# Patient Record
Sex: FEMALE | Race: WHITE | NOT HISPANIC OR LATINO | Employment: UNEMPLOYED | URBAN - METROPOLITAN AREA
[De-identification: names, ages, dates, MRNs, and addresses within clinical notes are randomized per-mention and may not be internally consistent; named-entity substitution may affect disease eponyms.]

---
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
## 2022-02-09 ENCOUNTER — TELEPHONE (OUTPATIENT)
Dept: FAMILY MEDICINE CLINIC | Facility: CLINIC | Age: 22
End: 2022-02-09

## 2022-02-09 NOTE — TELEPHONE ENCOUNTER
Caller: LIVIER CHEN    Relationship: Emergency Contact    Best call back number: [phone]    What orders are you requesting (i.e. lab or imaging): LABS     In what timeframe would the patient need to come in: ASAP    Where will you receive your lab/imaging services: IN-OFFICE    Additional notes: THE PATIENT'S GRANDMOTHER WOULD LIKE ORDERS TO CHECK FOR ANEMIA FOR THE PATIENT AND A CALL BACK WHEN THE ORDERS ARE READY

## 2022-02-10 ENCOUNTER — OFFICE VISIT (OUTPATIENT)
Dept: FAMILY MEDICINE CLINIC | Facility: CLINIC | Age: 22
End: 2022-02-10

## 2022-02-10 DIAGNOSIS — Z53.21 PATIENT LEFT WITHOUT BEING SEEN: Primary | ICD-10-CM

## 2022-02-10 NOTE — PROGRESS NOTES
The patient left the office before care was provided and did not complete the visit because of personal reason and wait time.

## 2022-03-09 ENCOUNTER — OFFICE VISIT (OUTPATIENT)
Dept: FAMILY MEDICINE CLINIC | Facility: CLINIC | Age: 22
End: 2022-03-09

## 2022-03-09 VITALS
OXYGEN SATURATION: 99 % | BODY MASS INDEX: 26.13 KG/M2 | DIASTOLIC BLOOD PRESSURE: 70 MMHG | HEART RATE: 84 BPM | TEMPERATURE: 99.6 F | HEIGHT: 62 IN | RESPIRATION RATE: 18 BRPM | SYSTOLIC BLOOD PRESSURE: 129 MMHG | WEIGHT: 142 LBS

## 2022-03-09 DIAGNOSIS — G43.829 MENSTRUAL MIGRAINE WITHOUT STATUS MIGRAINOSUS, NOT INTRACTABLE: ICD-10-CM

## 2022-03-09 DIAGNOSIS — J30.1 SEASONAL ALLERGIC RHINITIS DUE TO POLLEN: Primary | ICD-10-CM

## 2022-03-09 DIAGNOSIS — J06.9 VIRAL UPPER RESPIRATORY TRACT INFECTION: ICD-10-CM

## 2022-03-09 PROCEDURE — 99213 OFFICE O/P EST LOW 20 MIN: CPT | Performed by: FAMILY MEDICINE

## 2022-03-09 RX ORDER — MECLIZINE HYDROCHLORIDE 25 MG/1
25 TABLET ORAL 3 TIMES DAILY PRN
Qty: 15 TABLET | Refills: 0 | OUTPATIENT
Start: 2022-03-09 | End: 2022-07-18

## 2022-03-09 NOTE — PROGRESS NOTES
"Chief Complaint  Earache (Bilateral ears, pressure and sore)    Subjective          Kristina Parson presents to [facility]  History of Present Illness     Patient presents with grandmother complaining of dizziness vertigo-like symptoms and ear fullness that woke her up bilaterally and sore.    She has been around sick contacts at home no loss no taste.  She has history of migraine, over-the-counter medicine can relief     She is taking Zoloft and seems to be helping with depressive symptoms anxiety    Objective   Vital Signs:   /70   Pulse 84   Temp 99.6 °F (37.6 °C)   Resp 18   Ht 157.5 cm (62\")   Wt 64.4 kg (142 lb)   SpO2 99%   BMI 25.97 kg/m²     Physical Exam  Vitals reviewed.   Constitutional:       Appearance: Normal appearance. She is well-developed.   HENT:      Head: Normocephalic and atraumatic.      Right Ear: Tympanic membrane normal. There is no impacted cerumen.      Left Ear: Tympanic membrane normal. There is no impacted cerumen.      Nose: Nose normal.   Eyes:      Conjunctiva/sclera: Conjunctivae normal.      Pupils: Pupils are equal, round, and reactive to light.   Cardiovascular:      Rate and Rhythm: Normal rate.   Pulmonary:      Effort: Pulmonary effort is normal.      Breath sounds: Normal breath sounds.   Abdominal:      General: There is no distension.   Musculoskeletal:      Cervical back: Tenderness present.   Skin:     General: Skin is warm and dry.   Neurological:      General: No focal deficit present.      Mental Status: She is alert and oriented to person, place, and time.   Psychiatric:         Mood and Affect: Mood and affect normal.         Behavior: Behavior normal.         Thought Content: Thought content normal.         Judgment: Judgment normal.        Result Review :                 Assessment and Plan    Diagnoses and all orders for this visit:    1. Seasonal allergic rhinitis due to pollen (Primary)    2. Viral upper respiratory " tract infection    3. Menstrual migraine without status migrainosus, not intractable    Other orders  -     meclizine (ANTIVERT) 25 MG tablet; Take 1 tablet by mouth 3 (Three) Times a Day As Needed for Dizziness.  Dispense: 15 tablet; Refill: 0      Patient will likely allergy related sinus drainage and congestion, causing ear pain decongestants could help we will prescribe meclizine for dizziness and vertigo-like symptoms continue allergy medicine additionally and migraine medicine for her headache    Handout on Epley maneuver to do at home, increase fluids and follow-up with no provement or worse         Follow Up   Return for Next scheduled follow up, Physical, Recheck.  Patient was given instructions and counseling regarding her condition or for health maintenance advice. Please see specific information pulled into the AVS if appropriate.

## 2022-03-14 ENCOUNTER — TELEPHONE (OUTPATIENT)
Dept: FAMILY MEDICINE CLINIC | Facility: CLINIC | Age: 22
End: 2022-03-14

## 2022-03-14 DIAGNOSIS — H81.03 VERTIGO, LABYRINTHINE, BILATERAL: Primary | ICD-10-CM

## 2022-03-14 NOTE — TELEPHONE ENCOUNTER
Pt was wondering if you could place a referral for physical therapy for her ears.    Pt seen last week

## 2022-03-14 NOTE — TELEPHONE ENCOUNTER
Please let her I know placed the order sorry if its no better, hopefully PTA or some therapy will help

## 2022-03-20 PROBLEM — H81.10 BPPV (BENIGN PAROXYSMAL POSITIONAL VERTIGO): Status: ACTIVE | Noted: 2022-03-20

## 2022-04-19 ENCOUNTER — OFFICE VISIT (OUTPATIENT)
Dept: FAMILY MEDICINE CLINIC | Facility: CLINIC | Age: 22
End: 2022-04-19

## 2022-04-19 VITALS
HEART RATE: 67 BPM | SYSTOLIC BLOOD PRESSURE: 119 MMHG | HEIGHT: 62 IN | OXYGEN SATURATION: 99 % | WEIGHT: 144 LBS | DIASTOLIC BLOOD PRESSURE: 68 MMHG | TEMPERATURE: 97.8 F | RESPIRATION RATE: 18 BRPM | BODY MASS INDEX: 26.5 KG/M2

## 2022-04-19 DIAGNOSIS — F41.1 GAD (GENERALIZED ANXIETY DISORDER): Chronic | ICD-10-CM

## 2022-04-19 DIAGNOSIS — N92.1 MENORRHAGIA WITH IRREGULAR CYCLE: Primary | ICD-10-CM

## 2022-04-19 DIAGNOSIS — Z30.41 ENCOUNTER FOR SURVEILLANCE OF CONTRACEPTIVE PILLS: ICD-10-CM

## 2022-04-19 PROCEDURE — 99214 OFFICE O/P EST MOD 30 MIN: CPT | Performed by: NURSE PRACTITIONER

## 2022-04-19 NOTE — ASSESSMENT & PLAN NOTE
Pt does desire pregnancy at this time.   Discussed benefits vs consequences with patient   Advised to abstain from smoking/ETOH use if actively trying to become pregnant

## 2022-04-19 NOTE — PROGRESS NOTES
Chief Complaint   Patient presents with   • Menorrhagia     30 days with no period then 17 days of bleeding.       Subjective          Kristina Parson presents to [facility]    Reviewed all recent labs and medications. Pt states she recently stopped BCP last November. She had been on Depo, patch, or BCP for many years before. States last month she was 30 days late starting, then bleeding lasted for 17 days. Bleeding did not stay heavy but was continuous. States she does desire pregnancy at this time. That is why she quit BCP.       Past History:  Medical History: has a past medical history of Closed displaced fracture of fifth metatarsal bone (09/26/2018), Femur fracture (HCC), POLI (generalized anxiety disorder), Hand injury, Hand pain, right, Migraine, Nausea (7/11/2021), Otalgia, and Otitis media.   Surgical History: has a past surgical history that includes Femur fracture surgery (2014) and [place] tooth extraction (2020).   Family History: family history includes Diabetes in her paternal grandfather and paternal grandmother; No Known Problems in her father and mother; Stroke in her paternal grandfather and paternal grandmother.   Social History: reports that she has never smoked. She has never used smokeless tobacco. She reports that she does not drink alcohol and does not use drugs.  Allergies: Patient has no known allergies.  (Not in a hospital admission)       Social History     Socioeconomic History   • Marital status: Single   Tobacco Use   • Smoking status: Never Smoker   • Smokeless tobacco: Never Used   Vaping Use   • Vaping Use: Every day   • Substances: Nicotine, Flavoring   • Devices: Disposable   Substance and Sexual Activity   • Alcohol use: Never   • Drug use: Never   • Sexual activity: Yes     Partners: Male     Birth control/protection: None       There are no preventive care reminders to display for this patient.    Objective     Vital Signs:   /68   Pulse 67  "  Temp 97.8 °F (36.6 °C)   Resp 18   Ht 157.5 cm (62\")   Wt 65.3 kg (144 lb)   SpO2 99%   BMI 26.34 kg/m²       Physical Exam  Vitals reviewed.   Constitutional:       General: She is not in acute distress.     Appearance: Normal appearance.   HENT:      Head: Normocephalic.      Right Ear: Tympanic membrane normal.      Left Ear: Tympanic membrane normal.      Nose: Nose normal.      Mouth/Throat:      Pharynx: Oropharynx is clear. No posterior oropharyngeal erythema.   Eyes:      General: No scleral icterus.     Extraocular Movements: Extraocular movements intact.      Conjunctiva/sclera: Conjunctivae normal.      Pupils: Pupils are equal, round, and reactive to light.   Cardiovascular:      Rate and Rhythm: Normal rate and regular rhythm.      Pulses: Normal pulses.      Heart sounds: Normal heart sounds.   Pulmonary:      Effort: Pulmonary effort is normal.      Breath sounds: Normal breath sounds.   Abdominal:      General: Bowel sounds are normal.      Palpations: Abdomen is soft.   Musculoskeletal:         General: Normal range of motion.      Cervical back: Neck supple.   Skin:     General: Skin is warm and dry.   Neurological:      Mental Status: She is alert and oriented to person, place, and time.   Psychiatric:         Mood and Affect: Mood normal.         Behavior: Behavior normal.         Thought Content: Thought content normal.         Judgment: Judgment normal.          Review of Systems   Constitutional: Negative for chills, fatigue and fever.   HENT: Negative for congestion, ear pain, sinus pressure and sore throat.    Eyes: Negative for blurred vision.   Respiratory: Negative for cough and shortness of breath.    Cardiovascular: Negative for chest pain, palpitations and leg swelling.   Gastrointestinal: Negative for abdominal pain, constipation, diarrhea, nausea, vomiting and GERD.   Genitourinary: Positive for menstrual problem.   Musculoskeletal: Negative for arthralgias.   Skin: Negative " for rash and skin lesions.   Neurological: Negative for dizziness and headache.   Psychiatric/Behavioral: Negative for sleep disturbance, suicidal ideas and depressed mood. The patient is not nervous/anxious.         Result Review :     Common labs    Common Labsle 4/26/21 4/26/21 7/10/21 7/10/21    2136 2136 1307 1307   Glucose  101 (A) 110 (A)    BUN  7 9    Creatinine  0.75 0.82    eGFR Non African Am   90    Sodium  137 140    Potassium  3.6 4.3    Chloride  102 102    Calcium  9.1 9.8    Albumin  4.1 4.50    Total Bilirubin  0.25 0.3    Alkaline Phosphatase  51 63    AST (SGOT)  20 25    ALT (SGPT)  16 18    WBC 7.47   11.70 (A)   Hemoglobin 14.5   15.0   Hematocrit 44.5   45.5   Platelets 190   217   (A) Abnormal value                      Assessment and Plan    Diagnoses and all orders for this visit:    1. Menorrhagia with irregular cycle (Primary)  Comments:  discussed normal menstrual cycle following DC of birth control  Advised this may take several months to regulate   CTM   Consider OB/GYN referral if needed     2. POLI (generalized anxiety disorder)  Assessment & Plan:  Psychological condition is improving with treatment.  Continue current treatment regimen.  Regular aerobic exercise.  Psychological condition  will be reassessed at the next regular appointment.      3. Encounter for surveillance of contraceptive pills  Assessment & Plan:  Pt does desire pregnancy at this time.   Discussed benefits vs consequences with patient   Advised to abstain from smoking/ETOH use if actively trying to become pregnant             Follow Up   Return if symptoms worsen or fail to improve.  Patient was given instructions and counseling regarding her condition or for health maintenance advice. Please see specific information pulled into the AVS if appropriate.

## 2022-05-17 ENCOUNTER — TELEPHONE (OUTPATIENT)
Dept: FAMILY MEDICINE CLINIC | Facility: CLINIC | Age: 22
End: 2022-05-17

## 2022-05-17 DIAGNOSIS — N93.9 VAGINAL BLEEDING: Primary | ICD-10-CM

## 2022-05-17 NOTE — TELEPHONE ENCOUNTER
Caller: LIVIER CHEN    Relationship: Emergency Contact    Best call back number: [phone]    What is the medical concern/diagnosis: VAGINAL BLEEDING    What specialty or service is being requested: GYNECOLOGIST     Any additional details: PATIENTS GRANDMOTHER STATES THAT KARINA GRANDA TOLD THE PATIENT DURING HER LAST VISIT THAT SHE WOULD REFER HER TO A GYNECOLOGIST IF THE PATIENTS BLEEDING DIDN'T CEASE.

## 2022-05-25 ENCOUNTER — OFFICE VISIT (OUTPATIENT)
Dept: FAMILY MEDICINE CLINIC | Facility: CLINIC | Age: 22
End: 2022-05-25

## 2022-05-25 VITALS
HEART RATE: 111 BPM | RESPIRATION RATE: 18 BRPM | OXYGEN SATURATION: 100 % | WEIGHT: 142 LBS | DIASTOLIC BLOOD PRESSURE: 75 MMHG | HEIGHT: 62 IN | TEMPERATURE: 98.7 F | BODY MASS INDEX: 26.13 KG/M2 | SYSTOLIC BLOOD PRESSURE: 120 MMHG

## 2022-05-25 DIAGNOSIS — J30.1 SEASONAL ALLERGIC RHINITIS DUE TO POLLEN: ICD-10-CM

## 2022-05-25 DIAGNOSIS — R09.89 CHEST CONGESTION: ICD-10-CM

## 2022-05-25 DIAGNOSIS — N42.1 CONGESTION AND HEMORRHAGE OF PROSTATE: ICD-10-CM

## 2022-05-25 DIAGNOSIS — F41.1 GAD (GENERALIZED ANXIETY DISORDER): Chronic | ICD-10-CM

## 2022-05-25 DIAGNOSIS — J02.9 SORE THROAT: ICD-10-CM

## 2022-05-25 DIAGNOSIS — R05.9 COUGH: Primary | ICD-10-CM

## 2022-05-25 LAB
EXPIRATION DATE: NORMAL
FLUAV AG NPH QL: NEGATIVE
FLUBV AG NPH QL: NEGATIVE
INTERNAL CONTROL: NORMAL
Lab: NORMAL
S PYO AG THROAT QL: NEGATIVE
SARS-COV-2 AG UPPER RESP QL IA.RAPID: NOT DETECTED

## 2022-05-25 PROCEDURE — 87880 STREP A ASSAY W/OPTIC: CPT | Performed by: FAMILY MEDICINE

## 2022-05-25 PROCEDURE — 99214 OFFICE O/P EST MOD 30 MIN: CPT | Performed by: FAMILY MEDICINE

## 2022-05-25 PROCEDURE — 87426 SARSCOV CORONAVIRUS AG IA: CPT | Performed by: FAMILY MEDICINE

## 2022-05-25 PROCEDURE — 87804 INFLUENZA ASSAY W/OPTIC: CPT | Performed by: FAMILY MEDICINE

## 2022-05-25 RX ORDER — CEFDINIR 300 MG/1
300 CAPSULE ORAL 2 TIMES DAILY
Qty: 20 CAPSULE | Refills: 0 | OUTPATIENT
Start: 2022-05-25 | End: 2022-07-18

## 2022-05-25 RX ORDER — GUAIFENESIN AND CODEINE PHOSPHATE 100; 10 MG/5ML; MG/5ML
5 SOLUTION ORAL 3 TIMES DAILY PRN
Qty: 118 ML | Refills: 0 | OUTPATIENT
Start: 2022-05-25 | End: 2022-07-18

## 2022-06-01 ENCOUNTER — TELEPHONE (OUTPATIENT)
Dept: OBSTETRICS AND GYNECOLOGY | Facility: CLINIC | Age: 22
End: 2022-06-01

## 2022-06-01 RX ORDER — SERTRALINE HYDROCHLORIDE 25 MG/1
25 TABLET, FILM COATED ORAL DAILY
Qty: 30 TABLET | Refills: 2 | Status: SHIPPED | OUTPATIENT
Start: 2022-06-01 | End: 2022-07-20

## 2022-06-01 NOTE — TELEPHONE ENCOUNTER
Left message informing patient of appt cancellation on 7/13. Advised patient to call office to get that appt rescheduled.

## 2022-06-04 PROBLEM — S72.90XA FEMUR FRACTURE: Status: RESOLVED | Noted: 2021-08-05 | Resolved: 2022-06-04

## 2022-06-04 PROBLEM — J30.1 SEASONAL ALLERGIC RHINITIS DUE TO POLLEN: Chronic | Status: ACTIVE | Noted: 2021-08-05

## 2022-06-04 PROBLEM — S62.308D: Status: RESOLVED | Noted: 2018-09-26 | Resolved: 2022-06-04

## 2022-06-04 RX ORDER — FLUTICASONE PROPIONATE 50 MCG
SPRAY, SUSPENSION (ML) NASAL
COMMUNITY
Start: 2022-05-31 | End: 2022-07-18

## 2022-07-18 ENCOUNTER — APPOINTMENT (OUTPATIENT)
Dept: GENERAL RADIOLOGY | Facility: HOSPITAL | Age: 22
End: 2022-07-18

## 2022-07-18 ENCOUNTER — HOSPITAL ENCOUNTER (EMERGENCY)
Facility: HOSPITAL | Age: 22
Discharge: HOME | End: 2022-07-18
Attending: STUDENT IN AN ORGANIZED HEALTH CARE EDUCATION/TRAINING PROGRAM | Admitting: STUDENT IN AN ORGANIZED HEALTH CARE EDUCATION/TRAINING PROGRAM

## 2022-07-18 VITALS
BODY MASS INDEX: 25.21 KG/M2 | WEIGHT: 137 LBS | RESPIRATION RATE: 23 BRPM | DIASTOLIC BLOOD PRESSURE: 78 MMHG | TEMPERATURE: 98.9 F | SYSTOLIC BLOOD PRESSURE: 125 MMHG | OXYGEN SATURATION: 98 % | HEIGHT: 62 IN | HEART RATE: 110 BPM

## 2022-07-18 DIAGNOSIS — F41.9 ANXIETY: ICD-10-CM

## 2022-07-18 DIAGNOSIS — R00.2 PALPITATIONS: Primary | ICD-10-CM

## 2022-07-18 LAB
ALBUMIN SERPL-MCNC: 4.7 G/DL (ref 3.5–5.2)
ALBUMIN/GLOB SERPL: 1.6 G/DL
ALP SERPL-CCNC: 69 U/L (ref 39–117)
ALT SERPL W P-5'-P-CCNC: 11 U/L (ref 1–33)
AMPHET+METHAMPHET UR QL: NEGATIVE
ANION GAP SERPL CALCULATED.3IONS-SCNC: 10.9 MMOL/L (ref 5–15)
AST SERPL-CCNC: 18 U/L (ref 1–32)
BARBITURATES UR QL SCN: NEGATIVE
BASOPHILS # BLD AUTO: 0.05 10*3/MM3 (ref 0–0.2)
BASOPHILS NFR BLD AUTO: 0.4 % (ref 0–1.5)
BENZODIAZ UR QL SCN: NEGATIVE
BILIRUB SERPL-MCNC: 0.3 MG/DL (ref 0–1.2)
BILIRUB UR QL STRIP: NEGATIVE
BUN SERPL-MCNC: 9 MG/DL (ref 6–20)
BUN/CREAT SERPL: 11 (ref 7–25)
CALCIUM SPEC-SCNC: 10 MG/DL (ref 8.6–10.5)
CANNABINOIDS SERPL QL: NEGATIVE
CHLORIDE SERPL-SCNC: 103 MMOL/L (ref 98–107)
CLARITY UR: CLEAR
CO2 SERPL-SCNC: 27.1 MMOL/L (ref 22–29)
COCAINE UR QL: NEGATIVE
COLOR UR: YELLOW
CREAT SERPL-MCNC: 0.82 MG/DL (ref 0.57–1)
DEPRECATED RDW RBC AUTO: 39.9 FL (ref 37–54)
EGFRCR SERPLBLD CKD-EPI 2021: 105.2 ML/MIN/1.73
EOSINOPHIL # BLD AUTO: 0.05 10*3/MM3 (ref 0–0.4)
EOSINOPHIL NFR BLD AUTO: 0.4 % (ref 0.3–6.2)
ERYTHROCYTE [DISTWIDTH] IN BLOOD BY AUTOMATED COUNT: 12.6 % (ref 12.3–15.4)
GLOBULIN UR ELPH-MCNC: 2.9 GM/DL
GLUCOSE SERPL-MCNC: 102 MG/DL (ref 65–99)
GLUCOSE UR STRIP-MCNC: NEGATIVE MG/DL
HCT VFR BLD AUTO: 42.9 % (ref 34–46.6)
HGB BLD-MCNC: 14.5 G/DL (ref 12–15.9)
HGB UR QL STRIP.AUTO: NEGATIVE
HOLD SPECIMEN: NORMAL
HOLD SPECIMEN: NORMAL
IMM GRANULOCYTES # BLD AUTO: 0.02 10*3/MM3 (ref 0–0.05)
IMM GRANULOCYTES NFR BLD AUTO: 0.2 % (ref 0–0.5)
KETONES UR QL STRIP: NEGATIVE
LEUKOCYTE ESTERASE UR QL STRIP.AUTO: NEGATIVE
LYMPHOCYTES # BLD AUTO: 1.9 10*3/MM3 (ref 0.7–3.1)
LYMPHOCYTES NFR BLD AUTO: 14.8 % (ref 19.6–45.3)
MAGNESIUM SERPL-MCNC: 1.9 MG/DL (ref 1.7–2.2)
MCH RBC QN AUTO: 29.6 PG (ref 26.6–33)
MCHC RBC AUTO-ENTMCNC: 33.8 G/DL (ref 31.5–35.7)
MCV RBC AUTO: 87.6 FL (ref 79–97)
METHADONE UR QL SCN: NEGATIVE
MONOCYTES # BLD AUTO: 0.8 10*3/MM3 (ref 0.1–0.9)
MONOCYTES NFR BLD AUTO: 6.2 % (ref 5–12)
NEUTROPHILS NFR BLD AUTO: 10.05 10*3/MM3 (ref 1.7–7)
NEUTROPHILS NFR BLD AUTO: 78 % (ref 42.7–76)
NITRITE UR QL STRIP: NEGATIVE
NRBC BLD AUTO-RTO: 0 /100 WBC (ref 0–0.2)
OPIATES UR QL: NEGATIVE
OXYCODONE UR QL SCN: NEGATIVE
PH UR STRIP.AUTO: 6.5 [PH] (ref 5–8)
PLATELET # BLD AUTO: 185 10*3/MM3 (ref 140–450)
PMV BLD AUTO: 10.2 FL (ref 6–12)
POTASSIUM SERPL-SCNC: 3.6 MMOL/L (ref 3.5–5.2)
PROT SERPL-MCNC: 7.6 G/DL (ref 6–8.5)
PROT UR QL STRIP: NEGATIVE
RBC # BLD AUTO: 4.9 10*6/MM3 (ref 3.77–5.28)
SODIUM SERPL-SCNC: 141 MMOL/L (ref 136–145)
SP GR UR STRIP: <=1.005 (ref 1–1.03)
T4 FREE SERPL-MCNC: 1.58 NG/DL (ref 0.93–1.7)
TROPONIN T SERPL-MCNC: <0.01 NG/ML (ref 0–0.03)
TSH SERPL DL<=0.05 MIU/L-ACNC: 0.71 UIU/ML (ref 0.27–4.2)
UROBILINOGEN UR QL STRIP: NORMAL
WBC NRBC COR # BLD: 12.87 10*3/MM3 (ref 3.4–10.8)
WHOLE BLOOD HOLD COAG: NORMAL
WHOLE BLOOD HOLD SPECIMEN: NORMAL

## 2022-07-18 PROCEDURE — 99283 EMERGENCY DEPT VISIT LOW MDM: CPT

## 2022-07-18 PROCEDURE — 80307 DRUG TEST PRSMV CHEM ANLYZR: CPT | Performed by: NURSE PRACTITIONER

## 2022-07-18 PROCEDURE — 84484 ASSAY OF TROPONIN QUANT: CPT

## 2022-07-18 PROCEDURE — 93005 ELECTROCARDIOGRAM TRACING: CPT | Performed by: STUDENT IN AN ORGANIZED HEALTH CARE EDUCATION/TRAINING PROGRAM

## 2022-07-18 PROCEDURE — 93005 ELECTROCARDIOGRAM TRACING: CPT

## 2022-07-18 PROCEDURE — 80053 COMPREHEN METABOLIC PANEL: CPT

## 2022-07-18 PROCEDURE — 83735 ASSAY OF MAGNESIUM: CPT

## 2022-07-18 PROCEDURE — 81003 URINALYSIS AUTO W/O SCOPE: CPT | Performed by: NURSE PRACTITIONER

## 2022-07-18 PROCEDURE — 84443 ASSAY THYROID STIM HORMONE: CPT | Performed by: NURSE PRACTITIONER

## 2022-07-18 PROCEDURE — 36415 COLL VENOUS BLD VENIPUNCTURE: CPT

## 2022-07-18 PROCEDURE — 71045 X-RAY EXAM CHEST 1 VIEW: CPT

## 2022-07-18 PROCEDURE — 84439 ASSAY OF FREE THYROXINE: CPT | Performed by: NURSE PRACTITIONER

## 2022-07-18 PROCEDURE — 93010 ELECTROCARDIOGRAM REPORT: CPT | Performed by: INTERNAL MEDICINE

## 2022-07-18 PROCEDURE — 85025 COMPLETE CBC W/AUTO DIFF WBC: CPT

## 2022-07-18 RX ORDER — SODIUM CHLORIDE 0.9 % (FLUSH) 0.9 %
10 SYRINGE (ML) INJECTION AS NEEDED
Status: DISCONTINUED | OUTPATIENT
Start: 2022-07-18 | End: 2022-07-18 | Stop reason: HOSPADM

## 2022-07-18 RX ORDER — HYDROXYZINE HYDROCHLORIDE 25 MG/1
25 TABLET, FILM COATED ORAL 3 TIMES DAILY PRN
Qty: 20 TABLET | Refills: 0 | Status: SHIPPED | OUTPATIENT
Start: 2022-07-18 | End: 2022-07-20

## 2022-07-18 RX ORDER — HYDROXYZINE HYDROCHLORIDE 25 MG/1
50 TABLET, FILM COATED ORAL ONCE
Status: COMPLETED | OUTPATIENT
Start: 2022-07-18 | End: 2022-07-18

## 2022-07-18 RX ADMIN — HYDROXYZINE HYDROCHLORIDE 50 MG: 25 TABLET, FILM COATED ORAL at 21:50

## 2022-07-18 NOTE — ED TRIAGE NOTES
Pt states her heart was pounding and she felt like she couldn't breathe. She does take anxiety medication but she doesn't think it's working. Pt has appointment with PCP on Wednesday but did not want to wait.

## 2022-07-19 LAB — QT INTERVAL: 337 MS

## 2022-07-19 NOTE — ED PROVIDER NOTES
Time: 21:30 EDT  Arrived by: Private vehicle  Chief Complaint: Heart palpitations and anxiety  History provided by: Patient  History is limited by: N/A    History of Present Illness:  Patient is a 22 y.o. year old female that presents to the emergency department with feels like anxiety is acting up and causing her heart to have palpitations      Palpitations  Palpitations quality:  Fast  Onset quality:  Sudden  Timing:  Intermittent  Progression:  Waxing and waning  Chronicity:  Recurrent  Context: anxiety    Context: not appetite suppressants, not blood loss, not bronchodilators, not caffeine, not illicit drugs, not nicotine and not stimulant use    Relieved by:  Nothing  Worsened by:  Nothing  Ineffective treatments: Patient takes Zoloft.  Associated symptoms: shortness of breath ( when occurs)    Associated symptoms: no back pain, no chest pain, no chest pressure, no cough, no diaphoresis, no dizziness, no hemoptysis, no leg pain, no lower extremity edema, no malaise/fatigue, no nausea, no near-syncope, no numbness, no orthopnea, no PND, no syncope, no vomiting and no weakness    Risk factors: no hx of DVT, no hx of PE, no hyperthyroidism and no stress            Similar Symptoms Previously: Yes  Recently seen: No      Patient Care Team  Primary Care Provider: Nils Myers    Past Medical History:     No Known Allergies  Past Medical History:   Diagnosis Date   • Anxiety    • Closed displaced fracture of fifth metatarsal bone 09/26/2018   • Femur fracture (HCC)    • POLI (generalized anxiety disorder)    • Hand injury     Right   • Hand pain, right    • Migraine    • Nausea 07/11/2021   • Otalgia    • Otitis media      Past Surgical History:   Procedure Laterality Date   • FEMUR FRACTURE SURGERY  2014   • WISDOM TOOTH EXTRACTION  2020     Family History   Problem Relation Age of Onset   • No Known Problems Mother    • No Known Problems Father    • Stroke Paternal Grandmother    • Diabetes Paternal Grandmother          Unspecified   • Stroke Paternal Grandfather    • Diabetes Paternal Grandfather         Unspecified       Home Medications:  Prior to Admission medications    Medication Sig Start Date End Date Taking? Authorizing Provider   cefdinir (OMNICEF) 300 MG capsule Take 1 capsule by mouth 2 (Two) Times a Day. 5/25/22   Nils Myers DO   fluticasone (FLONASE) 50 MCG/ACT nasal spray  5/31/22   Provider, MD mehrdad MitchellFENesin-codeine (GUAIFENESIN AC) 100-10 MG/5ML liquid Take 5 mL by mouth 3 (Three) Times a Day As Needed for Cough. 5/25/22   Nils Myers DO   meclizine (ANTIVERT) 25 MG tablet Take 1 tablet by mouth 3 (Three) Times a Day As Needed for Dizziness. 3/9/22   Nils Myers DO   sertraline (ZOLOFT) 25 MG tablet TAKE 1 TABLET BY MOUTH DAILY. 6/1/22   Nils Myers DO        Social History:   PT  reports that she has been smoking cigarettes. She has never used smokeless tobacco. She reports that she does not drink alcohol and does not use drugs.    Record Review:  I have reviewed the patient's records in [place].     Review of Systems  Review of Systems   Constitutional: Negative for chills, diaphoresis, fever and malaise/fatigue.   HENT: Negative for congestion, ear pain and sore throat.    Eyes: Negative for pain.   Respiratory: Positive for shortness of breath ( when occurs). Negative for cough, hemoptysis and chest tightness.    Cardiovascular: Positive for palpitations. Negative for chest pain, orthopnea, syncope, PND and near-syncope.   Gastrointestinal: Negative for abdominal pain, diarrhea, nausea and vomiting.   Genitourinary: Negative for flank pain and hematuria.   Musculoskeletal: Negative for back pain and joint swelling.   Skin: Negative for pallor.   Neurological: Negative for dizziness, seizures, weakness, numbness and headaches.   Psychiatric/Behavioral: The patient is nervous/anxious.    All other systems reviewed and are negative.       Physical Exam  /83   Pulse 90   Temp 98.9  "°F (37.2 °C) (Oral)   Resp 23   Ht 157.5 cm (62\")   Wt 62.1 kg (137 lb)   LMP 07/11/2022   SpO2 98%   BMI 25.06 kg/m²     Physical Exam  Vitals and nursing note reviewed.   Constitutional:       General: She is not in acute distress.     Appearance: Normal appearance. She is not toxic-appearing.   HENT:      Head: Normocephalic and atraumatic.      Right Ear: Tympanic membrane, ear canal and external ear normal.      Left Ear: Tympanic membrane, ear canal and external ear normal.      Nose: Nose normal.      Mouth/Throat:      Mouth: Mucous membranes are moist.   Eyes:      General: No scleral icterus.     Conjunctiva/sclera: Conjunctivae normal.   Cardiovascular:      Rate and Rhythm: Normal rate and regular rhythm.      Pulses: Normal pulses.      Heart sounds: Normal heart sounds.   Pulmonary:      Effort: Pulmonary effort is normal. No respiratory distress.      Breath sounds: Normal breath sounds.   Abdominal:      General: Abdomen is flat. Bowel sounds are normal.      Palpations: Abdomen is soft.      Tenderness: There is no abdominal tenderness.   Musculoskeletal:         General: Normal range of motion.      Cervical back: Normal range of motion and neck supple.   Skin:     General: Skin is warm and dry.   Neurological:      Mental Status: She is alert and oriented to person, place, and time.   Psychiatric:         Mood and Affect: Mood normal.         Behavior: Behavior normal.          ED Course  /83   Pulse 90   Temp 98.9 °F (37.2 °C) (Oral)   Resp 23   Ht 157.5 cm (62\")   Wt 62.1 kg (137 lb)   LMP 07/11/2022   SpO2 98%   BMI 25.06 kg/m²   Results for orders placed or performed during the hospital encounter of 07/18/22   Comprehensive Metabolic Panel    Specimen: Arm, Left; Blood   Result Value Ref Range    Glucose 102 (H) 65 - 99 mg/dL    BUN 9 6 - 20 mg/dL    Creatinine 0.82 0.57 - 1.00 mg/dL    Sodium 141 136 - 145 mmol/L    Potassium 3.6 3.5 - 5.2 mmol/L    Chloride 103 98 - 107 " mmol/L    CO2 27.1 22.0 - 29.0 mmol/L    Calcium 10.0 8.6 - 10.5 mg/dL    Total Protein 7.6 6.0 - 8.5 g/dL    Albumin 4.70 3.50 - 5.20 g/dL    ALT (SGPT) 11 1 - 33 U/L    AST (SGOT) 18 1 - 32 U/L    Alkaline Phosphatase 69 39 - 117 U/L    Total Bilirubin 0.3 0.0 - 1.2 mg/dL    Globulin 2.9 gm/dL    A/G Ratio 1.6 g/dL    BUN/Creatinine Ratio 11.0 7.0 - 25.0    Anion Gap 10.9 5.0 - 15.0 mmol/L    eGFR 105.2 >60.0 mL/min/1.73   Magnesium    Specimen: Arm, Left; Blood   Result Value Ref Range    Magnesium 1.9 1.7 - 2.2 mg/dL   Troponin    Specimen: Arm, Left; Blood   Result Value Ref Range    Troponin T <0.010 0.000 - 0.030 ng/mL   CBC Auto Differential    Specimen: Arm, Left; Blood   Result Value Ref Range    WBC 12.87 (H) 3.40 - 10.80 10*3/mm3    RBC 4.90 3.77 - 5.28 10*6/mm3    Hemoglobin 14.5 12.0 - 15.9 g/dL    Hematocrit 42.9 34.0 - 46.6 %    MCV 87.6 79.0 - 97.0 fL    MCH 29.6 26.6 - 33.0 pg    MCHC 33.8 31.5 - 35.7 g/dL    RDW 12.6 12.3 - 15.4 %    RDW-SD 39.9 37.0 - 54.0 fl    MPV 10.2 6.0 - 12.0 fL    Platelets 185 140 - 450 10*3/mm3    Neutrophil % 78.0 (H) 42.7 - 76.0 %    Lymphocyte % 14.8 (L) 19.6 - 45.3 %    Monocyte % 6.2 5.0 - 12.0 %    Eosinophil % 0.4 0.3 - 6.2 %    Basophil % 0.4 0.0 - 1.5 %    Immature Grans % 0.2 0.0 - 0.5 %    Neutrophils, Absolute 10.05 (H) 1.70 - 7.00 10*3/mm3    Lymphocytes, Absolute 1.90 0.70 - 3.10 10*3/mm3    Monocytes, Absolute 0.80 0.10 - 0.90 10*3/mm3    Eosinophils, Absolute 0.05 0.00 - 0.40 10*3/mm3    Basophils, Absolute 0.05 0.00 - 0.20 10*3/mm3    Immature Grans, Absolute 0.02 0.00 - 0.05 10*3/mm3    nRBC 0.0 0.0 - 0.2 /100 WBC   Urinalysis With Microscopic If Indicated (No Culture) - Urine, Clean Catch    Specimen: Urine, Clean Catch   Result Value Ref Range    Color, UA Yellow Yellow, Straw    Appearance, UA Clear Clear    pH, UA 6.5 5.0 - 8.0    Specific Gravity, UA <=1.005 1.005 - 1.030    Glucose, UA Negative Negative    Ketones, UA Negative Negative     Bilirubin, UA Negative Negative    Blood, UA Negative Negative    Protein, UA Negative Negative    Leuk Esterase, UA Negative Negative    Nitrite, UA Negative Negative    Urobilinogen, UA 0.2 E.U./dL 0.2 - 1.0 E.U./dL   Urine Drug Screen - Urine, Clean Catch    Specimen: Urine, Clean Catch   Result Value Ref Range    Amphet/Methamphet, Screen Negative Negative    Barbiturates Screen, Urine Negative Negative    Benzodiazepine Screen, Urine Negative Negative    Cocaine Screen, Urine Negative Negative    Opiate Screen Negative Negative    THC, Screen, Urine Negative Negative    Methadone Screen, Urine Negative Negative    Oxycodone Screen, Urine Negative Negative   TSH    Specimen: Arm, Left; Blood   Result Value Ref Range    TSH 0.713 0.270 - 4.200 uIU/mL   T4, Free    Specimen: Arm, Left; Blood   Result Value Ref Range    Free T4 1.58 0.93 - 1.70 ng/dL   ECG 12 Lead   Result Value Ref Range    QT Interval 337 ms   Green Top (Gel)   Result Value Ref Range    Extra Tube Hold for add-ons.    Lavender Top   Result Value Ref Range    Extra Tube hold for add-on    Gold Top - SST   Result Value Ref Range    Extra Tube Hold for add-ons.    Light Blue Top   Result Value Ref Range    Extra Tube Hold for add-ons.      Medications   sodium chloride 0.9 % flush 10 mL (has no administration in time range)   hydrOXYzine (ATARAX) tablet 50 mg (has no administration in time range)     XR Chest 1 View    Result Date: 7/18/2022  Narrative: PROCEDURE: XR CHEST 1 VW  COMPARISON: [facility], , CHEST PA/AP & LAT 2V, 7/11/2020, 15:54.  INDICATIONS: Dysrhythmia Triage Protocol  FINDINGS:  FINDINGS: Single view of the chest reveals no cardiac enlargement or pleural effusion.  There is no acute airspace disease.  Mediastinal contour unremarkable.  No definitive acute osseous abnormalities are seen on this single view.   IMPRESSION: No acute disease.    NGUYEN TOM MD       Electronically Signed and Approved By: NGUYEN  MD VAISHALI on 7/18/2022 at 20:17               Procedures/EKGs:  Procedures    EKG:  Narrative & Impression    HEART RATE= 97  bpm  RR Interval= 620  ms  MN Interval= 138  ms  P Horizontal Axis= 39  deg  P Front Axis= 65  deg  QRSD Interval= 83  ms  QT Interval= 337  ms  QRS Axis= 21  deg  T Wave Axis= 27  deg  - NORMAL ECG -  Sinus rhythm       Medical Decision Making:                     MDM  Number of Diagnoses or Management Options  Anxiety  Palpitations  Diagnosis management comments: The patient was evaluated in the emergency department for palpitations. The patient had an EKG that shows no acute changes. Specifically, there are no ST elevations, t-wave changes of concern, delta waves, or rhythm abnormalities (atrial fibrillation, atrial flutter, SVT, ventricular tachycardia) warranting admission. The patient was placed on the cardiac monitor and observed with continuous telemetry. The patient has a chest x-ray that is negative for pneumothorax, pneumonia, and is essentially unremarkable. The patient has a normal troponin level. The patient was counseled that these palpitations may either be premature atrial contractions or premature ventricular contractions. The patient was counseled to increase their sleep and decrease their stress if possible. The patient was also counseled to decrease caffeine intake. The patient was advised to abstain from cold medications, diet pills, and ``natural´´ vitamin supplements with stimulants. The patient was counseled to follow up with a primary care physician or cardiologist for the possibility of wearing a Holter monitor within the next 2-3 days.      The patient is resting comfortably and feels better, is alert and in no distress. The repeat examination is unremarkable and benign. Electrocardiogram shows no signs of acute ischemia and the history, exam, diagnostic testing and current condition did not suggest that this patient is having an acute myocardial infarction,  significant arrhythmia, unstable angina, esophageal perforation, pulmonary embolism, aortic dissection, severe pneumonia, sepsis for other significant pathology that would warrant further testing, continued ED treatment, admission, cardiology or other specialist consultation at this point. The vital signs have been stable. The patient's condition is stable and appropriate for discharge. The patient has expressed a clear and thorough understanding and agreed to follow-up as instructed.       Amount and/or Complexity of Data Reviewed  Clinical lab tests: reviewed and ordered  Tests in the radiology section of CPT®: reviewed and ordered  Tests in the medicine section of CPT®: reviewed and ordered    Risk of Complications, Morbidity, and/or Mortality  Presenting problems: low  Diagnostic procedures: low  Management options: low    Patient Progress  Patient progress: stable       Final diagnoses:   Palpitations   Anxiety        Disposition:  ED Disposition     ED Disposition   Discharge    Condition   Stable    Comment   --              Venecia Raya, APRN  07/18/22 [id number]

## 2022-07-20 ENCOUNTER — DOCUMENTATION (OUTPATIENT)
Dept: FAMILY MEDICINE CLINIC | Facility: CLINIC | Age: 22
End: 2022-07-20

## 2022-07-20 ENCOUNTER — TELEMEDICINE (OUTPATIENT)
Dept: FAMILY MEDICINE CLINIC | Facility: CLINIC | Age: 22
End: 2022-07-20

## 2022-07-20 ENCOUNTER — APPOINTMENT (OUTPATIENT)
Dept: GENERAL RADIOLOGY | Facility: HOSPITAL | Age: 22
End: 2022-07-20

## 2022-07-20 ENCOUNTER — HOSPITAL ENCOUNTER (EMERGENCY)
Facility: HOSPITAL | Age: 22
Discharge: HOME | End: 2022-07-20
Attending: EMERGENCY MEDICINE | Admitting: EMERGENCY MEDICINE

## 2022-07-20 VITALS
SYSTOLIC BLOOD PRESSURE: 122 MMHG | BODY MASS INDEX: 22.04 KG/M2 | WEIGHT: 137.13 LBS | TEMPERATURE: 98.3 F | HEIGHT: 66 IN | HEART RATE: 103 BPM | RESPIRATION RATE: 20 BRPM | OXYGEN SATURATION: 98 % | DIASTOLIC BLOOD PRESSURE: 78 MMHG

## 2022-07-20 VITALS — WEIGHT: 137 LBS | BODY MASS INDEX: 25.06 KG/M2

## 2022-07-20 DIAGNOSIS — F41.9 ANXIETY: Primary | ICD-10-CM

## 2022-07-20 DIAGNOSIS — F41.1 GAD (GENERALIZED ANXIETY DISORDER): Primary | ICD-10-CM

## 2022-07-20 LAB
ALBUMIN SERPL-MCNC: 5.1 G/DL (ref 3.5–5.2)
ALBUMIN/GLOB SERPL: 1.7 G/DL
ALP SERPL-CCNC: 68 U/L (ref 39–117)
ALT SERPL W P-5'-P-CCNC: 11 U/L (ref 1–33)
ANION GAP SERPL CALCULATED.3IONS-SCNC: 10.8 MMOL/L (ref 5–15)
AST SERPL-CCNC: 17 U/L (ref 1–32)
BASOPHILS # BLD AUTO: 0.04 10*3/MM3 (ref 0–0.2)
BASOPHILS NFR BLD AUTO: 0.3 % (ref 0–1.5)
BILIRUB SERPL-MCNC: 0.4 MG/DL (ref 0–1.2)
BUN SERPL-MCNC: 12 MG/DL (ref 6–20)
BUN/CREAT SERPL: 13.6 (ref 7–25)
CALCIUM SPEC-SCNC: 10.1 MG/DL (ref 8.6–10.5)
CHLORIDE SERPL-SCNC: 104 MMOL/L (ref 98–107)
CO2 SERPL-SCNC: 27.2 MMOL/L (ref 22–29)
CREAT SERPL-MCNC: 0.88 MG/DL (ref 0.57–1)
DEPRECATED RDW RBC AUTO: 39.5 FL (ref 37–54)
EGFRCR SERPLBLD CKD-EPI 2021: 96.6 ML/MIN/1.73
EOSINOPHIL # BLD AUTO: 0.04 10*3/MM3 (ref 0–0.4)
EOSINOPHIL NFR BLD AUTO: 0.3 % (ref 0.3–6.2)
ERYTHROCYTE [DISTWIDTH] IN BLOOD BY AUTOMATED COUNT: 12.1 % (ref 12.3–15.4)
GLOBULIN UR ELPH-MCNC: 3 GM/DL
GLUCOSE SERPL-MCNC: 87 MG/DL (ref 65–99)
HCT VFR BLD AUTO: 45.2 % (ref 34–46.6)
HGB BLD-MCNC: 15 G/DL (ref 12–15.9)
HOLD SPECIMEN: NORMAL
HOLD SPECIMEN: NORMAL
IMM GRANULOCYTES # BLD AUTO: 0.04 10*3/MM3 (ref 0–0.05)
IMM GRANULOCYTES NFR BLD AUTO: 0.3 % (ref 0–0.5)
LYMPHOCYTES # BLD AUTO: 1.39 10*3/MM3 (ref 0.7–3.1)
LYMPHOCYTES NFR BLD AUTO: 11.6 % (ref 19.6–45.3)
MAGNESIUM SERPL-MCNC: 2.3 MG/DL (ref 1.7–2.2)
MCH RBC QN AUTO: 29.2 PG (ref 26.6–33)
MCHC RBC AUTO-ENTMCNC: 33.2 G/DL (ref 31.5–35.7)
MCV RBC AUTO: 88.1 FL (ref 79–97)
MONOCYTES # BLD AUTO: 0.59 10*3/MM3 (ref 0.1–0.9)
MONOCYTES NFR BLD AUTO: 4.9 % (ref 5–12)
NEUTROPHILS NFR BLD AUTO: 82.6 % (ref 42.7–76)
NEUTROPHILS NFR BLD AUTO: 9.89 10*3/MM3 (ref 1.7–7)
NRBC BLD AUTO-RTO: 0 /100 WBC (ref 0–0.2)
PLATELET # BLD AUTO: 217 10*3/MM3 (ref 140–450)
PMV BLD AUTO: 9.9 FL (ref 6–12)
POTASSIUM SERPL-SCNC: 3.9 MMOL/L (ref 3.5–5.2)
PROT SERPL-MCNC: 8.1 G/DL (ref 6–8.5)
RBC # BLD AUTO: 5.13 10*6/MM3 (ref 3.77–5.28)
SODIUM SERPL-SCNC: 142 MMOL/L (ref 136–145)
TROPONIN T SERPL-MCNC: <0.01 NG/ML (ref 0–0.03)
WBC NRBC COR # BLD: 11.99 10*3/MM3 (ref 3.4–10.8)
WHOLE BLOOD HOLD COAG: NORMAL
WHOLE BLOOD HOLD SPECIMEN: NORMAL

## 2022-07-20 PROCEDURE — 71045 X-RAY EXAM CHEST 1 VIEW: CPT

## 2022-07-20 PROCEDURE — 83735 ASSAY OF MAGNESIUM: CPT

## 2022-07-20 PROCEDURE — 99283 EMERGENCY DEPT VISIT LOW MDM: CPT

## 2022-07-20 PROCEDURE — 93005 ELECTROCARDIOGRAM TRACING: CPT | Performed by: EMERGENCY MEDICINE

## 2022-07-20 PROCEDURE — 25010000002 LORAZEPAM PER 2 MG: Performed by: EMERGENCY MEDICINE

## 2022-07-20 PROCEDURE — 80053 COMPREHEN METABOLIC PANEL: CPT

## 2022-07-20 PROCEDURE — 96374 THER/PROPH/DIAG INJ IV PUSH: CPT

## 2022-07-20 PROCEDURE — 93010 ELECTROCARDIOGRAM REPORT: CPT | Performed by: INTERNAL MEDICINE

## 2022-07-20 PROCEDURE — 36415 COLL VENOUS BLD VENIPUNCTURE: CPT

## 2022-07-20 PROCEDURE — 84484 ASSAY OF TROPONIN QUANT: CPT

## 2022-07-20 PROCEDURE — 85025 COMPLETE CBC W/AUTO DIFF WBC: CPT

## 2022-07-20 PROCEDURE — 99213 OFFICE O/P EST LOW 20 MIN: CPT | Performed by: FAMILY MEDICINE

## 2022-07-20 PROCEDURE — 93005 ELECTROCARDIOGRAM TRACING: CPT

## 2022-07-20 RX ORDER — SODIUM CHLORIDE 0.9 % (FLUSH) 0.9 %
10 SYRINGE (ML) INJECTION AS NEEDED
Status: DISCONTINUED | OUTPATIENT
Start: 2022-07-20 | End: 2022-07-20 | Stop reason: HOSPADM

## 2022-07-20 RX ORDER — LORAZEPAM 2 MG/ML
1 INJECTION INTRAMUSCULAR ONCE
Status: COMPLETED | OUTPATIENT
Start: 2022-07-20 | End: 2022-07-20

## 2022-07-20 RX ORDER — BUPROPION HYDROCHLORIDE 150 MG/1
150 TABLET, EXTENDED RELEASE ORAL DAILY
Qty: 30 TABLET | Refills: 2 | Status: SHIPPED | OUTPATIENT
Start: 2022-07-20 | End: 2022-07-20

## 2022-07-20 RX ORDER — CLONAZEPAM 0.5 MG/1
0.5 TABLET ORAL DAILY PRN
Qty: 15 TABLET | Refills: 0 | Status: SHIPPED | OUTPATIENT
Start: 2022-07-20 | End: 2022-07-21

## 2022-07-20 RX ADMIN — LORAZEPAM 1 MG: 2 INJECTION INTRAMUSCULAR; INTRAVENOUS at 17:12

## 2022-07-20 NOTE — ED PROVIDER NOTES
"Time: 4:55 PM EDT  Arrived by: ambulance  Chief Complaint: Palpiations  History provided by: Patient  History is limited by: N/A     History of Present Illness:  Patient is a 22 y.o. year old female who presents to the emergency department with palpitations. Pt has h/o Generalized Anxiety Disorder, managed with Wellbutrin. Pt was seen 2 days ago in the ED for the same symptoms. Pt reports onset of palpitations today after taking medication. Pt states episodes of anxiety aren't regularly accompanied with palpitations, states this is a recent occurrence. Pt reports 1 episode of emesis yesterday morning, but attributes it to anxiety. Pt denies cough, fever, chest tighntness, but states she feels \"heaviness\" on her chest.       History provided by:  Patient   used: No    Palpitations  Palpitations quality:  Unable to specify  Onset quality:  Sudden  Timing:  Constant  Progression:  Waxing and waning  Chronicity:  New  Context: anxiety    Context comment:  Medication  Relieved by:  None tried  Associated symptoms: no chest pain, no cough, no nausea, no shortness of breath and no vomiting    Associated symptoms comment:  Chest \"heaviness\"      Similar Symptoms Previously: yes  Recently seen: yes      Patient Care Team  Primary Care Provider: Nils Myers DO    Past Medical History:     No Known Allergies  Past Medical History:   Diagnosis Date   • Anxiety    • Closed displaced fracture of fifth metatarsal bone 09/26/2018   • Femur fracture (HCC)    • POLI (generalized anxiety disorder)    • Hand injury     Right   • Hand pain, right    • Migraine    • Nausea 07/11/2021   • Otalgia    • Otitis media      Past Surgical History:   Procedure Laterality Date   • FEMUR FRACTURE SURGERY  2014   • WISDOM TOOTH EXTRACTION  2020     Family History   Problem Relation Age of Onset   • No Known Problems Mother    • No Known Problems Father    • Stroke Paternal Grandmother    • Diabetes Paternal Grandmother         " "Unspecified   • Stroke Paternal Grandfather    • Diabetes Paternal Grandfather         Unspecified       Home Medications:  Prior to Admission medications    Medication Sig Start Date End Date Taking? Authorizing Provider   clonazePAM (KlonoPIN) 0.5 MG tablet Take 1 tablet by mouth Daily As Needed for Anxiety. 7/20/22   Nils Myers DO   metoprolol tartrate (LOPRESSOR) 25 MG tablet Take 1 tablet by mouth 2 (Two) Times a Day for 2 days. 7/20/22 7/22/22  Nils Myers DO   buPROPion SR (Wellbutrin SR) 150 MG 12 hr tablet Take 1 tablet by mouth Daily. 7/20/22 7/20/22  Nils Myers DO   hydrOXYzine (ATARAX) 25 MG tablet Take 1 tablet by mouth 3 (Three) Times a Day As Needed for Anxiety. 7/18/22 7/20/22  Venecia Raya APRN   sertraline (ZOLOFT) 25 MG tablet TAKE 1 TABLET BY MOUTH DAILY. 6/1/22 7/20/22  Nils Myers DO        Social History:   Social History     Tobacco Use   • Smoking status: Never Smoker   • Smokeless tobacco: Never Used   Vaping Use   • Vaping Use: Every day   • Substances: Nicotine, Flavoring   • Devices: Disposable   Substance Use Topics   • Alcohol use: Never   • Drug use: Never         Review of Systems:  Review of Systems   Constitutional: Negative for chills and fever.   HENT: Negative for congestion, rhinorrhea and sore throat.    Eyes: Negative for pain and visual disturbance.   Respiratory: Negative for apnea, cough, chest tightness and shortness of breath.    Cardiovascular: Positive for palpitations. Negative for chest pain.        Chest \"heaviness\"   Gastrointestinal: Negative for abdominal pain, diarrhea, nausea and vomiting.   Genitourinary: Negative for difficulty urinating and dysuria.   Musculoskeletal: Negative for joint swelling and myalgias.   Skin: Negative for color change.   Neurological: Negative for seizures and headaches.   Psychiatric/Behavioral: Negative.    All other systems reviewed and are negative.       Physical Exam:  /78   Pulse 103   Temp 98.3 °F (36.8 " "°C) (Oral)   Resp 20   Ht 167.6 cm (66\")   Wt 62.2 kg (137 lb 2 oz)   LMP 07/11/2022   SpO2 98%   BMI 22.13 kg/m²     Physical Exam  Vitals and nursing note reviewed.   Constitutional:       General: She is not in acute distress.     Appearance: Normal appearance. She is not toxic-appearing.   HENT:      Head: Normocephalic and atraumatic.      Jaw: There is normal jaw occlusion.   Eyes:      General: Lids are normal.      Extraocular Movements: Extraocular movements intact.      Conjunctiva/sclera: Conjunctivae normal.      Pupils: Pupils are equal, round, and reactive to light.   Cardiovascular:      Rate and Rhythm: Normal rate and regular rhythm.      Pulses: Normal pulses.      Heart sounds: Normal heart sounds.   Pulmonary:      Effort: Pulmonary effort is normal. No respiratory distress.      Breath sounds: Normal breath sounds. No wheezing or rhonchi.   Abdominal:      General: Abdomen is flat.      Palpations: Abdomen is soft.      Tenderness: There is no abdominal tenderness. There is no guarding or rebound.   Musculoskeletal:         General: Normal range of motion.      Cervical back: Normal range of motion and neck supple.      Right lower leg: No edema.      Left lower leg: No edema.   Skin:     General: Skin is warm and dry.   Neurological:      Mental Status: She is alert and oriented to person, place, and time. Mental status is at baseline.   Psychiatric:         Mood and Affect: Mood normal.                Medications in the Emergency Department:  Medications   sodium chloride 0.9 % flush 10 mL (has no administration in time range)   LORazepam (ATIVAN) injection 1 mg (1 mg Intravenous Given 7/20/22 1712)        Labs  Lab Results (last 24 hours)     Procedure Component Value Units Date/Time    CBC & Differential [[id number]]  (Abnormal) Collected: 07/20/22 1430    Specimen: Blood Updated: 07/20/22 1452    Narrative:      The following orders were created for panel order CBC & " Differential.  Procedure                               Abnormality         Status                     ---------                               -----------         ------                     CBC Auto Differential[[id number]]        Abnormal            Final result                 Please view results for these tests on the individual orders.    Comprehensive Metabolic Panel [[id number]] Collected: 07/20/22 1430    Specimen: Blood Updated: 07/20/22 1517     Glucose 87 mg/dL      BUN 12 mg/dL      Creatinine 0.88 mg/dL      Sodium 142 mmol/L      Potassium 3.9 mmol/L      Chloride 104 mmol/L      CO2 27.2 mmol/L      Calcium 10.1 mg/dL      Total Protein 8.1 g/dL      Albumin 5.10 g/dL      ALT (SGPT) 11 U/L      AST (SGOT) 17 U/L      Alkaline Phosphatase 68 U/L      Total Bilirubin 0.4 mg/dL      Globulin 3.0 gm/dL      A/G Ratio 1.7 g/dL      BUN/Creatinine Ratio 13.6     Anion Gap 10.8 mmol/L      eGFR 96.6 mL/min/1.73      Comment: National Kidney Foundation and American Society of Nephrology (ASN) Task Force recommended calculation based on the Chronic Kidney Disease Epidemiology Collaboration (CKD-EPI) equation refit without adjustment for race.       Narrative:      GFR Normal >60  Chronic Kidney Disease <60  Kidney Failure <15      Magnesium [[id number]]  (Abnormal) Collected: 07/20/22 1430    Specimen: Blood Updated: 07/20/22 1517     Magnesium 2.3 mg/dL     Troponin [[id number]]  (Normal) Collected: 07/20/22 1430    Specimen: Blood Updated: 07/20/22 1516     Troponin T <0.010 ng/mL     Narrative:      Troponin T Reference Range:  <= 0.03 ng/mL-   Negative for AMI  >0.03 ng/mL-     Abnormal for myocardial necrosis.  Clinicians would have to utilize clinical acumen, EKG, Troponin and serial changes to determine if it is an Acute Myocardial Infarction or myocardial injury due to an underlying chronic condition.       Results may be falsely decreased if patient taking Biotin.      CBC Auto Differential [[id number]]   (Abnormal) Collected: 07/20/22 1430    Specimen: Blood Updated: 07/20/22 1452     WBC 11.99 10*3/mm3      RBC 5.13 10*6/mm3      Hemoglobin 15.0 g/dL      Hematocrit 45.2 %      MCV 88.1 fL      MCH 29.2 pg      MCHC 33.2 g/dL      RDW 12.1 %      RDW-SD 39.5 fl      MPV 9.9 fL      Platelets 217 10*3/mm3      Neutrophil % 82.6 %      Lymphocyte % 11.6 %      Monocyte % 4.9 %      Eosinophil % 0.3 %      Basophil % 0.3 %      Immature Grans % 0.3 %      Neutrophils, Absolute 9.89 10*3/mm3      Lymphocytes, Absolute 1.39 10*3/mm3      Monocytes, Absolute 0.59 10*3/mm3      Eosinophils, Absolute 0.04 10*3/mm3      Basophils, Absolute 0.04 10*3/mm3      Immature Grans, Absolute 0.04 10*3/mm3      nRBC 0.0 /100 WBC            Imaging:  XR Chest 1 View    Result Date: 7/20/2022  PROCEDURE: XR CHEST 1 VW  COMPARISON: [facility], , XR CHEST 1 VW, 7/18/2022, 19:02.  INDICATIONS: Dysrhythmia Triage Protocol  FINDINGS:  The lungs are clear bilaterally.  The cardiac and mediastinal silhouettes appear normal.  No effusion is seen.        1. No acute cardiopulmonary disease       Derek Garcia M.D.       Electronically Signed and Approved By: Derek Garcia M.D. on 7/20/2022 at 17:44               Procedures:  Procedures    Progress  ED Course as of 07/20/22 1821 Wed Jul 20, 2022 1820 EKG:    Rhythm: sinus  Rate: 100  Axis: normal  Intervals: normal  ST Segment: no elevations    EKG Comparison: unchanged    Interpreted by me   [BN]      ED Course User Index  [BN] Tereso Capellan MD                            Medical Decision Making:  MDM  Number of Diagnoses or Management Options  Anxiety  Diagnosis management comments: The patient was evaluated in the emergency department for palpitations. The patient had an EKG that shows no acute changes. Specifically, there are no ST elevations, t-wave changes of concern, delta waves, or rhythm abnormalities (atrial fibrillation, atrial flutter, SVT, ventricular  tachycardia) warranting admission. The patient was placed on the cardiac monitor and observed with continuous telemetry. The patient has a chest x-ray that is negative for pneumothorax, pneumonia, and is essentially unremarkable. The patient has a normal troponin level. The patient was counseled that these palpitations may either be premature atrial contractions or premature ventricular contractions. The patient was counseled to increase their sleep and decrease their stress if possible. The patient was also counseled to decrease caffeine intake. The patient was advised to abstain from cold medications, diet pills, and ``natural´´ vitamin supplements with stimulants. The patient was counseled to follow up with a primary care physician or cardiologist for the possibility of wearing a Holter monitor within the next 2-3 days.      The patient is resting comfortably and feels better, is alert and in no distress.  The patient´s CBC was reviewed and shows no abnormalities of critical concern. Of note, there is no anemia requiring a blood transfusion and the platelet count is acceptable.  The patient´s CMP was reviewed and shows no abnormalities of critical concern. Of note, the patient´s sodium and potassium are acceptable. The patient´s liver enzymes are unremarkable. The patient´s renal function (creatinine) is preserved. The patient has a normal anion gap.  Patient states that she did start Wellbutrin today which is new for her.  She was advised to cease the Wellbutrin.  The repeat examination is unremarkable and benign. Electrocardiogram shows no signs of acute ischemia and the history, exam, diagnostic testing and current condition did not suggest that this patient is having an acute myocardial infarction, significant arrhythmia, unstable angina, esophageal perforation, pulmonary embolism, aortic dissection, severe pneumonia, sepsis for other significant pathology that would warrant further testing, continued ED  treatment, admission, cardiology or other specialist consultation at this point. The vital signs have been stable. The patient's condition is stable and appropriate for discharge. The patient has expressed a clear and thorough understanding and agreed to follow-up as instructed.          Amount and/or Complexity of Data Reviewed  Clinical lab tests: reviewed  Tests in the radiology section of CPT®: reviewed  Tests in the medicine section of CPT®: reviewed  Independent visualization of images, tracings, or specimens: yes    Risk of Complications, Morbidity, and/or Mortality  Presenting problems: moderate  Management options: moderate    Patient Progress  Patient progress: stable       Final diagnoses:   Anxiety        Disposition:  ED Disposition     ED Disposition   Discharge    Condition   Stable    Comment   --             This medical record created using voice recognition software.    Documentation assistance provided by Flaquita Segovia acting as scribe for Tereso Capellan MD. Information recorded by the scribe was done at my direction and has been verified and validated by me.          Flaquita Segovia  07/20/22 [id number]       Tereso Capellan MD  07/20/22 [id number]

## 2022-07-20 NOTE — PROGRESS NOTES
Chief Complaint  Anxiety    Subjective         Kristina Parson presents to [facility]  History of Present Illness     Patient presents via video with increased anxiety the Zoloft has not been helping or sad to call the ambulance because of palpitations put on hydroxyzine to help with anxiety and not helpful either    Objective   Vital Signs:   Wt 62.1 kg (137 lb)   BMI 25.06 kg/m²     Physical Exam   Constitutional: She appears well-developed and well-nourished.   HENT:   Head: Normocephalic.   Eyes: Pupils are equal, round, and reactive to light.   Pulmonary/Chest: Effort normal.   Neurological: She is alert.   Skin: Skin is warm.   Psychiatric: She has a normal mood and affect.     Result Review :   The following data was reviewed by: Nils Myers DO on 07/20/2022:  Common labs    Common Labsle 7/18/22 7/18/22    1855 1855   Glucose  102 (A)   BUN  9   Creatinine  0.82   Sodium  141   Potassium  3.6   Chloride  103   Calcium  10.0   Albumin  4.70   Total Bilirubin  0.3   Alkaline Phosphatase  69   AST (SGOT)  18   ALT (SGPT)  11   WBC 12.87 (A)    Hemoglobin 14.5    Hematocrit 42.9    Platelets 185    (A) Abnormal value                      Assessment and Plan    Diagnoses and all orders for this visit:    1. POLI (generalized anxiety disorder) (Primary)  -     clonazePAM (KlonoPIN) 0.5 MG tablet; Take 1 tablet by mouth Daily As Needed for Anxiety.  Dispense: 15 tablet; Refill: 0    Other orders  -     buPROPion SR (Wellbutrin SR) 150 MG 12 hr tablet; Take 1 tablet by mouth Daily.  Dispense: 30 tablet; Refill: 2      Prescribe Klonopin as needed Wellbutrin to start additionally for anxiety close follow-up we will see it later this week and see if improved or helping    **patient called with tachycardia shortly after starting wellbutrin, going to ED, discontinued med and tried to send metoprolol or consider propranolol for anxiety hereafter, tried to call ED to alert, is going via  EMS     Follow Up   Return in about 1 week (around 7/27/2022) for Recheck.  Patient was given instructions and counseling regarding her condition or for health maintenance advice. Please see specific information pulled into the AVS if appropriate.     Mode of Visit: Video  Location of patient: home  You have chosen to receive care through a telehealth visit.  The patient has signed the video visit consent form.  The visit included audio and video interaction. No technical issues occurred during this visit.

## 2022-07-21 DIAGNOSIS — F41.1 GAD (GENERALIZED ANXIETY DISORDER): Primary | ICD-10-CM

## 2022-07-21 LAB — QT INTERVAL: 338 MS

## 2022-07-21 RX ORDER — LORAZEPAM 0.5 MG/1
0.5 TABLET ORAL EVERY 8 HOURS PRN
Qty: 30 TABLET | Refills: 2 | Status: SHIPPED | OUTPATIENT
Start: 2022-07-21 | End: 2022-08-17 | Stop reason: SDUPTHER

## 2022-07-23 ENCOUNTER — PATIENT MESSAGE (OUTPATIENT)
Dept: FAMILY MEDICINE CLINIC | Facility: CLINIC | Age: 22
End: 2022-07-23

## 2022-07-27 DIAGNOSIS — F41.9 ANXIETY: Primary | ICD-10-CM

## 2022-07-27 RX ORDER — PAROXETINE 10 MG/1
10 TABLET, FILM COATED ORAL EVERY MORNING
Qty: 30 TABLET | Refills: 0 | Status: SHIPPED | OUTPATIENT
Start: 2022-07-27 | End: 2022-08-17 | Stop reason: SDUPTHER

## 2022-07-27 NOTE — PROGRESS NOTES
patient sent [employer] message requesting medication for anxiety. Per Dr. Myers, sending in paxil 10mg daily and patient can follow up in 2-4 weeks

## 2022-08-01 DIAGNOSIS — R00.2 PALPITATIONS: Primary | ICD-10-CM

## 2022-08-01 DIAGNOSIS — F41.0 PANIC ATTACK: ICD-10-CM

## 2022-08-02 ENCOUNTER — TELEMEDICINE (OUTPATIENT)
Dept: FAMILY MEDICINE CLINIC | Facility: CLINIC | Age: 22
End: 2022-08-02

## 2022-08-02 VITALS — OXYGEN SATURATION: 96 % | HEART RATE: 41 BPM

## 2022-08-02 DIAGNOSIS — H81.10 BENIGN PAROXYSMAL POSITIONAL VERTIGO, UNSPECIFIED LATERALITY: ICD-10-CM

## 2022-08-02 DIAGNOSIS — F41.1 GAD (GENERALIZED ANXIETY DISORDER): Primary | ICD-10-CM

## 2022-08-02 DIAGNOSIS — R00.2 PALPITATIONS: ICD-10-CM

## 2022-08-02 DIAGNOSIS — F41.0 PANIC ATTACK: ICD-10-CM

## 2022-08-02 PROCEDURE — 99214 OFFICE O/P EST MOD 30 MIN: CPT | Performed by: FAMILY MEDICINE

## 2022-08-02 RX ORDER — BUPROPION HYDROCHLORIDE 150 MG/1
TABLET, EXTENDED RELEASE ORAL
COMMUNITY
Start: 2022-07-20 | End: 2022-08-02

## 2022-08-02 NOTE — PROGRESS NOTES
"Chief Complaint  Follow-up (Wants to discuss Paxil) and Discuss Cardiology referral    Subjective        Kristina Parson presents to [facility]  History of Present Illness    Doing better, still very fatigued, tired, less panic attacks and palpitations but still very anxious, not sleeping well, mind runs at night.     Vertigo at times, worse in morning, meclizine does help but makes her very tired like the rest of her meds she is taking, ativan up to 1mg every 8 hours, and metoprolol 25mg daily. Heart rate 94 today, feels like she cant get off the couch, on no supplements, never had anxiety or similar issue In the past, no past heart issues, septal defects or other.     Panic attacks, severe, gone to ED x2 for this, wellbutrin really worsened synmptoms., on ativan now prn and metoprolol for palpittations, cant hold a job due to condition, please see asap. Referred to cardiology with holter monitor additionally to evaluate, appreciate help      Objective   Vital Signs:  Pulse (!) 41   SpO2 96%   Estimated body mass index is 22.13 kg/m² as calculated from the following:    Height as of 7/20/22: 167.6 cm (66\").    Weight as of 7/20/22: 62.2 kg (137 lb 2 oz).    BMI is within normal parameters. No other follow-up for BMI required.      Physical Exam  Vitals reviewed.   Constitutional:       Appearance: Normal appearance. She is well-developed.   HENT:      Head: Normocephalic and atraumatic.      Right Ear: External ear normal.      Left Ear: External ear normal.      Nose: Nose normal.   Eyes:      Conjunctiva/sclera: Conjunctivae normal.      Pupils: Pupils are equal, round, and reactive to light.   Cardiovascular:      Rate and Rhythm: Normal rate.   Pulmonary:      Effort: Pulmonary effort is normal.      Breath sounds: Normal breath sounds.   Abdominal:      General: There is no distension.   Skin:     General: Skin is warm and dry.   Neurological:      General: No focal deficit " present.      Mental Status: She is alert and oriented to person, place, and time.   Psychiatric:         Mood and Affect: Mood and affect normal.         Behavior: Behavior normal.      Comments: Calm but still  Complaining of anxiousness        Result Review :  The following data was reviewed by: Nils Myers DO on 08/02/2022:  Common labs    Common Labsle 7/18/22 7/18/22 7/20/22 7/20/22    1855 1855 1430 1430   Glucose  102 (A)  87   BUN  9  12   Creatinine  0.82  0.88   Sodium  141  142   Potassium  3.6  3.9   Chloride  103  104   Calcium  10.0  10.1   Albumin  4.70  5.10   Total Bilirubin  0.3  0.4   Alkaline Phosphatase  69  68   AST (SGOT)  18  17   ALT (SGPT)  11  11   WBC 12.87 (A)  11.99 (A)    Hemoglobin 14.5  15.0    Hematocrit 42.9  45.2    Platelets 185  217    (A) Abnormal value            Data reviewed: Recent hospitalization notes ED notes x2 from palpitations, panic attacks, EKG reviewedq          Assessment and Plan   Diagnoses and all orders for this visit:    1. POLI (generalized anxiety disorder) (Primary)  -     Ambulatory Referral to Psychiatry    2. Panic attack  -     Ambulatory Referral to Psychiatry    3. Palpitations    4. Benign paroxysmal positional vertigo, unspecified laterality      Anxiety is continued refer to psychiatry soon as able Dr. Benitez referral appreciate seeing patient as soon as able on Ativan for anxiety up to 1/2 tablet every 8 hours have increased this up steadily over the last week, she is been in the ED x2 for palpitations she was treated with Ativan there which helped her condition Lexapro did not help Paxil was restarted because it helped in the past Wellbutrin made things much worse.  She is on metoprolol additionally to help monitor and control her heart rate better 94 today she is on just once a day metoprolol 25 mg tartrate cardiology upcoming Holter monitor ordered we will get a smart watch or Fitbit device to monitor heart rate additionally at home before  she sees specialist back consider changing her medications as these do make her pretty drowsy and more lethargic and likely not helping with vertigo consider ENT for this or other medicines, she is not taking any supplements Sudafed would help lady with some congestion and sinus allergy symptoms she has but this also worsens her palpitations and symptoms post follow-up with patient       Follow Up   Return in about 1 week (around 8/9/2022), or if symptoms worsen or fail to improve.  Patient was given instructions and counseling regarding her condition or for health maintenance advice. Please see specific information pulled into the AVS if appropriate.

## 2022-08-04 ENCOUNTER — DOCUMENTATION (OUTPATIENT)
Dept: FAMILY MEDICINE CLINIC | Facility: CLINIC | Age: 22
End: 2022-08-04

## 2022-08-04 RX ORDER — DILTIAZEM HYDROCHLORIDE 120 MG/1
120 CAPSULE, COATED, EXTENDED RELEASE ORAL DAILY
Qty: 30 CAPSULE | Refills: 0 | Status: SHIPPED | OUTPATIENT
Start: 2022-08-04 | End: 2022-08-04

## 2022-08-04 RX ORDER — DILTIAZEM HYDROCHLORIDE 120 MG/1
120 CAPSULE, COATED, EXTENDED RELEASE ORAL DAILY
Qty: 30 CAPSULE | Refills: 0 | Status: SHIPPED | OUTPATIENT
Start: 2022-08-04 | End: 2022-08-04 | Stop reason: SDUPTHER

## 2022-08-04 RX ORDER — METOPROLOL TARTRATE 50 MG/1
50 TABLET, FILM COATED ORAL 2 TIMES DAILY
Qty: 60 TABLET | Refills: 0
Start: 2022-08-04 | End: 2022-08-17 | Stop reason: SDUPTHER

## 2022-08-04 NOTE — PROGRESS NOTES
Error in reporting patient documentation, wrong patient, cleared with patients on medicine addition and will follow up

## 2022-08-17 ENCOUNTER — TELEMEDICINE (OUTPATIENT)
Dept: FAMILY MEDICINE CLINIC | Facility: CLINIC | Age: 22
End: 2022-08-17

## 2022-08-17 VITALS — HEIGHT: 66 IN | WEIGHT: 137 LBS | BODY MASS INDEX: 22.02 KG/M2 | HEART RATE: 77 BPM

## 2022-08-17 DIAGNOSIS — F41.0 PANIC ATTACK: ICD-10-CM

## 2022-08-17 DIAGNOSIS — F41.1 GAD (GENERALIZED ANXIETY DISORDER): Primary | ICD-10-CM

## 2022-08-17 DIAGNOSIS — F41.9 ANXIETY: ICD-10-CM

## 2022-08-17 DIAGNOSIS — R00.2 PALPITATIONS: ICD-10-CM

## 2022-08-17 PROCEDURE — 99213 OFFICE O/P EST LOW 20 MIN: CPT | Performed by: FAMILY MEDICINE

## 2022-08-17 RX ORDER — PAROXETINE 10 MG/1
10 TABLET, FILM COATED ORAL EVERY MORNING
Qty: 90 TABLET | Refills: 3 | Status: SHIPPED | OUTPATIENT
Start: 2022-08-17 | End: 2022-09-23 | Stop reason: SDUPTHER

## 2022-08-17 RX ORDER — LORAZEPAM 0.5 MG/1
0.75 TABLET ORAL EVERY 8 HOURS PRN
Qty: 135 TABLET | Refills: 2 | Status: SHIPPED | OUTPATIENT
Start: 2022-08-17 | End: 2022-12-31 | Stop reason: SDUPTHER

## 2022-08-17 NOTE — PROGRESS NOTES
"Chief Complaint  Follow-up    Subjective         Kristina Parson presents to [facility]  History of Present Illness     Patient presents via [employer] to follow-up on heart rate anxiety doing better taking Ativan as needed, says she is more open to going outside now but has trouble with crowds follows up with psychiatry and cardiology given the palpitations and tachycardia on metoprolol once a day heart rate 77 today feels much better monitoring her heart rate with Fitbit at home.  Needs refills    Objective   Vital Signs:   Pulse 77   Ht 167.6 cm (66\")   Wt 62.1 kg (137 lb)   BMI 22.11 kg/m²     Physical Exam   Constitutional: She appears well-developed and well-nourished.   HENT:   Head: Normocephalic.   Eyes: Pupils are equal, round, and reactive to light.   Pulmonary/Chest: Effort normal.   Neurological: She is alert.   Skin: Skin is warm.   Psychiatric: She has a normal mood and affect.   Vitals reviewed.    Result Review :   The following data was reviewed by: Nils Myers DO on 08/17/2022:  Common labs    Common Labsle 7/18/22 7/18/22 7/20/22 7/20/22    1855 1855 1430 1430   Glucose  102 (A)  87   BUN  9  12   Creatinine  0.82  0.88   Sodium  141  142   Potassium  3.6  3.9   Chloride  103  104   Calcium  10.0  10.1   Albumin  4.70  5.10   Total Bilirubin  0.3  0.4   Alkaline Phosphatase  69  68   AST (SGOT)  18  17   ALT (SGPT)  11  11   WBC 12.87 (A)  11.99 (A)    Hemoglobin 14.5  15.0    Hematocrit 42.9  45.2    Platelets 185  217    (A) Abnormal value                      Assessment and Plan    Diagnoses and all orders for this visit:    1. POLI (generalized anxiety disorder) (Primary)  -     LORazepam (Ativan) 0.5 MG tablet; Take 1.5 tablets by mouth Every 8 (Eight) Hours As Needed for Anxiety.  Dispense: 135 tablet; Refill: 2    2. Anxiety  -     PARoxetine (Paxil) 10 MG tablet; Take 1 tablet by mouth Every Morning.  Dispense: 90 tablet; Refill: 3    3. Panic attack    4. " Palpitations    Other orders  -     metoprolol tartrate (LOPRESSOR) 25 MG tablet; Take 1 tablet by mouth Daily for 2 days.  Dispense: 90 tablet; Refill: 3        Patient request refills on her medicines reviewed CHRISTINA has contract will pictures updated and UDS if not in the last year continue metoprolol 25 mg daily Paxil additionally follow-up with psychiatry and cardiology improved symptoms overall close follow-up      Follow Up   Return in about 6 weeks (around 9/28/2022), or if symptoms worsen or fail to improve.  Patient was given instructions and counseling regarding her condition or for health maintenance advice. Please see specific information pulled into the AVS if appropriate.     Mode of Visit: Video  Location of patient: home  Location of provider: [facility]  You have chosen to receive care through a telehealth visit.  Does the patient consent to use a video/audio connection for your medical care today? Yes  The visit included audio and video interaction. No technical issues occurred during this visit.

## 2022-08-30 ENCOUNTER — HOSPITAL ENCOUNTER (OUTPATIENT)
Dept: CARDIOLOGY | Facility: HOSPITAL | Age: 22
Discharge: HOME | End: 2022-08-30
Admitting: FAMILY MEDICINE

## 2022-08-30 DIAGNOSIS — F41.0 PANIC ATTACK: ICD-10-CM

## 2022-08-30 DIAGNOSIS — R00.2 PALPITATIONS: ICD-10-CM

## 2022-08-30 PROCEDURE — 93225 XTRNL ECG REC<48 HRS REC: CPT

## 2022-09-01 DIAGNOSIS — H65.112 NON-RECURRENT ACUTE ALLERGIC OTITIS MEDIA OF LEFT EAR: Primary | ICD-10-CM

## 2022-09-01 DIAGNOSIS — J30.1 SEASONAL ALLERGIC RHINITIS DUE TO POLLEN: ICD-10-CM

## 2022-09-01 RX ORDER — LEVOCETIRIZINE DIHYDROCHLORIDE 5 MG/1
5 TABLET, FILM COATED ORAL EVERY EVENING
Qty: 30 TABLET | Refills: 2 | Status: SHIPPED | OUTPATIENT
Start: 2022-09-01 | End: 2022-10-07

## 2022-09-01 RX ORDER — CEFDINIR 300 MG/1
300 CAPSULE ORAL 2 TIMES DAILY
Qty: 20 CAPSULE | Refills: 0 | Status: SHIPPED | OUTPATIENT
Start: 2022-09-01 | End: 2022-09-11

## 2022-09-09 LAB
MAXIMAL PREDICTED HEART RATE: 200 BPM
STRESS TARGET HR: 170 BPM

## 2022-09-12 ENCOUNTER — TELEPHONE (OUTPATIENT)
Dept: FAMILY MEDICINE CLINIC | Facility: CLINIC | Age: 22
End: 2022-09-12

## 2022-09-14 NOTE — PROGRESS NOTES
Chief Complaint  POLI and Panic Attack    Subjective    Patient is a 22-year-old female with a previous history of POLI with episodes of heart palpitations and more recently waking up with tachycardic episodes with a heart rate sometimes being about 100.  These episodes happen without any provoking factors symptoms associated with diaphoresis and shortness of breath symptoms.  Patient has been on metoprolol and Paxil longstanding but recently increased or added Ativan with has improved some tachycardia.    Past Medical History:   Diagnosis Date   • Anxiety    • Closed displaced fracture of fifth metatarsal bone 09/26/2018   • Femur fracture (HCC)    • POLI (generalized anxiety disorder)    • Hand injury     Right   • Hand pain, right    • Migraine    • Nausea 07/11/2021   • Otalgia    • Otitis media          Current Outpatient Medications:   •  levocetirizine (XYZAL) 5 MG tablet, Take 1 tablet by mouth Every Evening., Disp: 30 tablet, Rfl: 2  •  LORazepam (Ativan) 0.5 MG tablet, Take 1.5 tablets by mouth Every 8 (Eight) Hours As Needed for Anxiety., Disp: 135 tablet, Rfl: 2  •  PARoxetine (Paxil) 10 MG tablet, Take 1 tablet by mouth Every Morning., Disp: 90 tablet, Rfl: 3  •  metoprolol succinate XL (TOPROL-XL) 25 MG 24 hr tablet, Take 1 tablet by mouth Daily., Disp: 90 tablet, Rfl: 3    Medications Discontinued During This Encounter   Medication Reason   • metoprolol tartrate (LOPRESSOR) 25 MG tablet      Allergies   Allergen Reactions   • Wellbutrin [Bupropion] Palpitations        Social History     Tobacco Use   • Smoking status: Never Smoker   • Smokeless tobacco: Never Used   Vaping Use   • Vaping Use: Every day   • Substances: Nicotine, Flavoring   • Devices: Disposable   Substance Use Topics   • Alcohol use: Never   • Drug use: Never       Family History   Problem Relation Age of Onset   • No Known Problems Mother    • No Known Problems Father    • Stroke Paternal Grandmother    • Diabetes Paternal Grandmother   "       Unspecified   • Stroke Paternal Grandfather    • Diabetes Paternal Grandfather         Unspecified        Objective     /63   Pulse 60   Ht 157.5 cm (62\")   Wt 64.4 kg (142 lb)   BMI 25.97 kg/m²       Physical Exam    General Appearance:   · no acute distress  · Alert and oriented x3  HENT:   · lips not cyanotic  · Atraumatic  Neck:  · No jvd   · supple  Respiratory:  · no respiratory distress  · normal breath sounds  · no rales  Cardiovascular:  · Regular rate and rhythm  · no S3, no S4   · no murmur  · no rub  Extremities  · No cyanosis  · lower extremity edema: none    Skin:   · warm, dry  · No rashes      Result Review :     No results found for: PROBNP  CMP    CMP 7/18/22 7/20/22   Glucose 102 (A) 87   BUN 9 12   Creatinine 0.82 0.88   Sodium 141 142   Potassium 3.6 3.9   Chloride 103 104   Calcium 10.0 10.1   Albumin 4.70 5.10   Total Bilirubin 0.3 0.4   Alkaline Phosphatase 69 68   AST (SGOT) 18 17   ALT (SGPT) 11 11   (A) Abnormal value            CBC w/diff    CBC w/Diff 7/18/22 7/20/22   WBC 12.87 (A) 11.99 (A)   RBC 4.90 5.13   Hemoglobin 14.5 15.0   Hematocrit 42.9 45.2   MCV 87.6 88.1   MCH 29.6 29.2   MCHC 33.8 33.2   RDW 12.6 12.1 (A)   Platelets 185 217   Neutrophil Rel % 78.0 (A) 82.6 (A)   Immature Granulocyte Rel % 0.2 0.3   Lymphocyte Rel % 14.8 (A) 11.6 (A)   Monocyte Rel % 6.2 4.9 (A)   Eosinophil Rel % 0.4 0.3   Basophil Rel % 0.4 0.3   (A) Abnormal value             Lab Results   Component Value Date    TSH 0.713 07/18/2022      Lab Results   Component Value Date    FREET4 1.58 07/18/2022      No results found for: DDIMERQUANT  Magnesium   Date Value Ref Range Status   07/20/2022 2.3 (H) 1.7 - 2.2 mg/dL Final      No results found for: DIGOXIN   Lab Results   Component Value Date    TROPONINT <0.010 07/20/2022             No results found for: POCTROP              Holter monitor 8/22  Monitor placed on patient by SULEMA LAYNE on 8/30/2022  at 11:24 EDT.  The patient was " monitored for 1 days 23 hours and 53 minutes. Indications for this exam include PANIC DISORDER (EPISODIC PAROXYSMAL ANXIETY) WITHOUT AGORAPHOBIA. Total beats:  995952. Average HR:  62. Min HR:  43. Max HR:  137                Diagnoses and all orders for this visit:    1. Tachycardia (Primary)  Assessment & Plan:  Patient with recurrent tachycardic episodes on Holter monitor just sinus in nature suspect this is inappropriate sinus tachycardia and/or component of underlying anxiety.  Discussed this at length with the patient as well as its benign nature she has had some improvement with metoprolol recommend changing to a 24-hour dose and did encourage exercising as well.  Also avoidance of any stimulant products      Other orders  -     metoprolol succinate XL (TOPROL-XL) 25 MG 24 hr tablet; Take 1 tablet by mouth Daily.  Dispense: 90 tablet; Refill: 3          Follow Up     Return in about 6 months (around 3/15/2023).          Patient was given instructions and counseling regarding her condition or for health maintenance advice. Please see specific information pulled into the AVS if appropriate.

## 2022-09-15 ENCOUNTER — OFFICE VISIT (OUTPATIENT)
Dept: CARDIOLOGY | Facility: CLINIC | Age: 22
End: 2022-09-15

## 2022-09-15 VITALS
WEIGHT: 142 LBS | BODY MASS INDEX: 26.13 KG/M2 | SYSTOLIC BLOOD PRESSURE: 115 MMHG | HEART RATE: 60 BPM | DIASTOLIC BLOOD PRESSURE: 63 MMHG | HEIGHT: 62 IN

## 2022-09-15 DIAGNOSIS — R00.0 TACHYCARDIA: Primary | ICD-10-CM

## 2022-09-15 PROCEDURE — 99203 OFFICE O/P NEW LOW 30 MIN: CPT | Performed by: INTERNAL MEDICINE

## 2022-09-15 RX ORDER — METOPROLOL SUCCINATE 25 MG/1
25 TABLET, EXTENDED RELEASE ORAL DAILY
Qty: 90 TABLET | Refills: 3 | Status: SHIPPED | OUTPATIENT
Start: 2022-09-15

## 2022-09-15 NOTE — ASSESSMENT & PLAN NOTE
Patient with recurrent tachycardic episodes on Holter monitor just sinus in nature suspect this is inappropriate sinus tachycardia and/or component of underlying anxiety.  Discussed this at length with the patient as well as its benign nature she has had some improvement with metoprolol recommend changing to a 24-hour dose and did encourage exercising as well.  Also avoidance of any stimulant products

## 2022-09-23 ENCOUNTER — TELEMEDICINE (OUTPATIENT)
Dept: PSYCHIATRY | Facility: CLINIC | Age: 22
End: 2022-09-23

## 2022-09-23 DIAGNOSIS — F41.9 ANXIETY: ICD-10-CM

## 2022-09-23 DIAGNOSIS — F41.0 PANIC ATTACKS: ICD-10-CM

## 2022-09-23 DIAGNOSIS — F41.1 GENERALIZED ANXIETY DISORDER: ICD-10-CM

## 2022-09-23 DIAGNOSIS — F33.1 MAJOR DEPRESSIVE DISORDER, RECURRENT EPISODE, MODERATE: ICD-10-CM

## 2022-09-23 DIAGNOSIS — F41.0 PANIC DISORDER: Primary | ICD-10-CM

## 2022-09-23 PROCEDURE — 90792 PSYCH DIAG EVAL W/MED SRVCS: CPT | Performed by: STUDENT IN AN ORGANIZED HEALTH CARE EDUCATION/TRAINING PROGRAM

## 2022-09-23 RX ORDER — PAROXETINE HYDROCHLORIDE 20 MG/1
20 TABLET, FILM COATED ORAL EVERY MORNING
Qty: 30 TABLET | Refills: 2 | Status: SHIPPED | OUTPATIENT
Start: 2022-09-23 | End: 2022-10-21 | Stop reason: SDUPTHER

## 2022-09-23 NOTE — PROGRESS NOTES
"Subjective   Kristina Parson is a 22 y.o. female who presents today for initial evaluation     Referring Provider:  Nils Myers  [place] [address]    Chief Complaint: Anxiety    History of Present Illness:     9/23: Chart review: Seen by primary care August 2.  Still has vertigo at times in the mornings, on meclizine.  She is on Ativan 1 mg every 8 hours.  Has had severe panic attacks recently and has gone to the emergency room twice for this.  Wellbutrin worsened her symptoms.  Presently cannot hold a job.  July labs show reassuring CMP, slightly elevated WBCs on CBC at 11.99.  TSH and free T4 normal, magnesium slightly elevated July 20 at 2.3, problems with elevated heart rate in the past, now on metoprolol 25 daily.  UDS negative.  CT of the head in April for dizziness is negative.  Presently on a Holter monitor.  July EKG shows rate 100, sinus tachycardia, .  PDMP confirms lorazepam since July.  A few notes in Care Everywhere.    Psychiatric history: Lexapro did not help, Paxil was restarted because it helped in the past.  Wellbutrin made things worse.    \"Blaire\"    9/23: In person.  Interview:  1. Chart review: Consider a mood stabilizer like Abilify.  2. His/Her Story: \"I've had it for a couple years.\"  a. P?, G10  b. It just started; I have a little depression and abandonment issues from parents.  i. Parents were in and out of residential  ii. Lived most of life with grandma  iii. Unsure how it connects with anxiety  c. 2 years ago: Dad went to long term and Mom went to residential. Both gone for about a year or two.  i. Patient was in college at the time. Graduated. [employer].  ii. \"I don't think about it... I just hold it in.\" Of them being gone. \"It just hurts...\" (began to cry)  iii. Was living with gma, even so, was very difficult because \"we are very close.\"  d. Takes 0.75 bid  3. Depression/Mood:  a. All I want to do is sleep, but I get up at 6:30 am every " morning  b. Possible terminal insomnia  c. Depressed mood, anhedonia, hopelessness or guilt, poor energy, poor concentration, possible psychomotor retardation.  d. Seasonal pattern: def  e. Severity: Moderate  f. Duration: for years now  4. Anxiety:  a. Anxious around big crowds  b. Can't keep a job for a week  1. Being around people  2. HR goes up and stays up  3. Panic attacks: increased HR, soa,   c. Uncontrolled worrying, fatigue, poor concentration, feeling on edge or restless, irritability.  d. Severity: Moderate  e. Duration: 2 years ago  5. Panic attacks: yes, on ativan  6. ADHD: neg  a. Elementary school:   i. Grades? good  ii. Special classes or failures? Possibly for reading  iii. Got in trouble?  denies  iv. Referral for ADHD testing? n  b. Fhx: denies  7. PTSD: neg  8. Psych ROS: Positive for depression, anxiety.  Negative for psychosis and shea.  9. No SI HI AVH.  10. Substances: vaping  11. Therapy: denies  12. Medication compliant: y    Access to Firearms: denies    PHQ-9 Depression Screening  PHQ-9 Total Score:      Little interest or pleasure in doing things?     Feeling down, depressed, or hopeless?     Trouble falling or staying asleep, or sleeping too much?     Feeling tired or having little energy?     Poor appetite or overeating?     Feeling bad about yourself - or that you are a failure or have let yourself or your family down?     Trouble concentrating on things, such as reading the newspaper or watching television?     Moving or speaking so slowly that other people could have noticed? Or the opposite - being so fidgety or restless that you have been moving around a lot more than usual?     Thoughts that you would be better off dead, or of hurting yourself in some way?     PHQ-9 Total Score       POLI-7  Feeling nervous, anxious or on edge: More than half the days  Not being able to stop or control worrying: Several days  Worrying too much about different things: Several days  Trouble  Relaxing: Nearly every day  Being so restless that it is hard to sit still: Not at all  Feeling afraid as if something awful might happen: Not at all  Becoming easily annoyed or irritable: Nearly every day  POLI 7 Total Score: 10  If you checked any problems, how difficult have these problems made it for you to do your work, take care of things at home, or get along with other people: Somewhat difficult    Past Surgical History:  Past Surgical History:   Procedure Laterality Date   • FEMUR FRACTURE SURGERY  2014   • WISDOM TOOTH EXTRACTION  2020       Problem List:  Patient Active Problem List   Diagnosis   • Abnormal uterine bleeding (AUB)   • POLI (generalized anxiety disorder)   • Migraine   • Otitis media   • Seasonal allergic rhinitis due to pollen   • URI (upper respiratory infection)   • Encounter for surveillance of contraceptive pills   • BPPV (benign paroxysmal positional vertigo)   • Panic attack   • Tachycardia       Allergy:   Allergies   Allergen Reactions   • Wellbutrin [Bupropion] Palpitations        Discontinued Medications:  Medications Discontinued During This Encounter   Medication Reason   • PARoxetine (Paxil) 10 MG tablet Reorder       Current Medications:   Current Outpatient Medications   Medication Sig Dispense Refill   • LORazepam (Ativan) 0.5 MG tablet Take 1.5 tablets by mouth Every 8 (Eight) Hours As Needed for Anxiety. 135 tablet 2   • metoprolol succinate XL (TOPROL-XL) 25 MG 24 hr tablet Take 1 tablet by mouth Daily. 90 tablet 3   • PARoxetine (Paxil) 20 MG tablet Take 1 tablet by mouth Every Morning. 30 tablet 2   • levocetirizine (XYZAL) 5 MG tablet Take 1 tablet by mouth Every Evening. 30 tablet 2     No current facility-administered medications for this visit.       Past Medical History:  Past Medical History:   Diagnosis Date   • Anxiety    • Closed displaced fracture of fifth metatarsal bone 09/26/2018   • Femur fracture (HCC)    • POLI (generalized anxiety disorder)    • Hand  injury     Right   • Hand pain, right    • Migraine    • Nausea 2021   • Otalgia    • Otitis media        Past Psychiatric History:  Began Treatment: 17 yo, started on meds (PCP)  Diagnoses:Depression and Anxiety  Psychiatrist:Denies  Therapist:Denies  Admission History:     Not for psych: 17 yo was having panic attacks, in and out of hospitals for elevated HR, panic attacks    No psych admissions      Medication Trials:  Paxil now: unsure if working, has been on for a few weeks. Worked in the past    Prozac: can't remember why stopped it, but it worked    Zoloft: worked for a while and stopped working      lexapro: didn't work    wellbutrin made anx worse    Has never been on:  abilify  Olanzapine  seroquel  depakote  Lithium  gabapentin    Self Harm: Denies  Suicide Attempts:Denies   Psychosis, Anxiety, Depression: Denies    Substance Abuse History:   Types: vaping, denies all else  Withdrawal Symptoms:Denies  Longest Period Sober:Not Applicable   AA: Not applicable     Social History:  Martial Status: engaged  Employed:No  Kids:No  House:Lives in a house   History: Denies    Social History     Socioeconomic History   • Marital status: Single   Tobacco Use   • Smoking status: Never Smoker   • Smokeless tobacco: Never Used   Vaping Use   • Vaping Use: Every day   • Substances: Nicotine, Flavoring   • Devices: Disposable   Substance and Sexual Activity   • Alcohol use: Never   • Drug use: Never   • Sexual activity: Yes     Partners: Male     Birth control/protection: None       Family History:   Suicide Attempts: Denies  Suicide Completions:Denies      Family History   Problem Relation Age of Onset   • No Known Problems Mother    • No Known Problems Father    • Stroke Paternal Grandmother    • Diabetes Paternal Grandmother         Unspecified   • Stroke Paternal Grandfather    • Diabetes Paternal Grandfather         Unspecified       Developmental History:       Childhood: Denies Abuse  High  "School:Completed  College: [employer]    · Mental Status Exam  · Appearance  · : groomed, good eye contact, normal street clothes  · Behavior  · : pleasant and cooperative  · Motor  · : No abnormal  · Speech  · :normal rhythm, rate, volume, tone, not hyperverbal, not pressured, normal prosidy  · Mood  · : \"I've always been anxious and depressed.\"  · Affect  · : mild depression, briefly tearful, mood congruent, good variability  · Thought Content  · : negative suicidal ideations, negative homicidal ideations, negative obsessions  · Perceptions  · : negative auditory hallucinations, negative visual hallucinations  · Thought Process  · : linear  · Insight/Judgement  · : Fair/fair  · Cognition  · : grossly intact  · Attention   : intact      Review of Systems:  Review of Systems   Constitutional: Positive for diaphoresis and fatigue.       Physical Exam:  Physical Exam    Vital Signs:   There were no vitals taken for this visit.     Lab Results:   Hospital Outpatient Visit on 08/30/2022   Component Date Value Ref Range Status   • Target HR (85%) 08/30/2022 170  bpm Final   • Max. Pred. HR (100%) 08/30/2022 200  bpm Final   Admission on 07/20/2022, Discharged on 07/20/2022   Component Date Value Ref Range Status   • QT Interval 07/20/2022 338  ms Final   • Glucose 07/20/2022 87  65 - 99 mg/dL Final   • BUN 07/20/2022 12  6 - 20 mg/dL Final   • Creatinine 07/20/2022 0.88  0.57 - 1.00 mg/dL Final   • Sodium 07/20/2022 142  136 - 145 mmol/L Final   • Potassium 07/20/2022 3.9  3.5 - 5.2 mmol/L Final   • Chloride 07/20/2022 104  98 - 107 mmol/L Final   • CO2 07/20/2022 27.2  22.0 - 29.0 mmol/L Final   • Calcium 07/20/2022 10.1  8.6 - 10.5 mg/dL Final   • Total Protein 07/20/2022 8.1  6.0 - 8.5 g/dL Final   • Albumin 07/20/2022 5.10  3.50 - 5.20 g/dL Final   • ALT (SGPT) 07/20/2022 11  1 - 33 U/L Final   • AST (SGOT) 07/20/2022 17  1 - 32 U/L Final   • Alkaline Phosphatase 07/20/2022 68  39 - 117 U/L Final   • Total " Bilirubin 07/20/2022 0.4  0.0 - 1.2 mg/dL Final   • Globulin 07/20/2022 3.0  gm/dL Final   • A/G Ratio 07/20/2022 1.7  g/dL Final   • BUN/Creatinine Ratio 07/20/2022 13.6  7.0 - 25.0 Final   • Anion Gap 07/20/2022 10.8  5.0 - 15.0 mmol/L Final   • eGFR 07/20/2022 96.6  >60.0 mL/min/1.73 Final    National Kidney Foundation and American Society of Nephrology (ASN) Task Force recommended calculation based on the Chronic Kidney Disease Epidemiology Collaboration (CKD-EPI) equation refit without adjustment for race.   • Magnesium 07/20/2022 2.3 (A) 1.7 - 2.2 mg/dL Final   • Troponin T 07/20/2022 <0.010  0.000 - 0.030 ng/mL Final   • Extra Tube 07/20/2022 Hold for add-ons.   Final    Auto resulted.   • Extra Tube 07/20/2022 hold for add-on   Final    Auto resulted   • Extra Tube 07/20/2022 Hold for add-ons.   Final    Auto resulted.   • Extra Tube 07/20/2022 Hold for add-ons.   Final    Auto resulted   • WBC 07/20/2022 11.99 (A) 3.40 - 10.80 10*3/mm3 Final   • RBC 07/20/2022 5.13  3.77 - 5.28 10*6/mm3 Final   • Hemoglobin 07/20/2022 15.0  12.0 - 15.9 g/dL Final   • Hematocrit 07/20/2022 45.2  34.0 - 46.6 % Final   • MCV 07/20/2022 88.1  79.0 - 97.0 fL Final   • MCH 07/20/2022 29.2  26.6 - 33.0 pg Final   • MCHC 07/20/2022 33.2  31.5 - 35.7 g/dL Final   • RDW 07/20/2022 12.1 (A) 12.3 - 15.4 % Final   • RDW-SD 07/20/2022 39.5  37.0 - 54.0 fl Final   • MPV 07/20/2022 9.9  6.0 - 12.0 fL Final   • Platelets 07/20/2022 217  140 - 450 10*3/mm3 Final   • Neutrophil % 07/20/2022 82.6 (A) 42.7 - 76.0 % Final   • Lymphocyte % 07/20/2022 11.6 (A) 19.6 - 45.3 % Final   • Monocyte % 07/20/2022 4.9 (A) 5.0 - 12.0 % Final   • Eosinophil % 07/20/2022 0.3  0.3 - 6.2 % Final   • Basophil % 07/20/2022 0.3  0.0 - 1.5 % Final   • Immature Grans % 07/20/2022 0.3  0.0 - 0.5 % Final   • Neutrophils, Absolute 07/20/2022 9.89 (A) 1.70 - 7.00 10*3/mm3 Final   • Lymphocytes, Absolute 07/20/2022 1.39  0.70 - 3.10 10*3/mm3 Final   • Monocytes, Absolute  07/20/2022 0.59  0.10 - 0.90 10*3/mm3 Final   • Eosinophils, Absolute 07/20/2022 0.04  0.00 - 0.40 10*3/mm3 Final   • Basophils, Absolute 07/20/2022 0.04  0.00 - 0.20 10*3/mm3 Final   • Immature Grans, Absolute 07/20/2022 0.04  0.00 - 0.05 10*3/mm3 Final   • nRBC 07/20/2022 0.0  0.0 - 0.2 /100 WBC Final   Admission on 07/18/2022, Discharged on 07/18/2022   Component Date Value Ref Range Status   • QT Interval 07/18/2022 337  ms Final   • Glucose 07/18/2022 102 (A) 65 - 99 mg/dL Final   • BUN 07/18/2022 9  6 - 20 mg/dL Final   • Creatinine 07/18/2022 0.82  0.57 - 1.00 mg/dL Final   • Sodium 07/18/2022 141  136 - 145 mmol/L Final   • Potassium 07/18/2022 3.6  3.5 - 5.2 mmol/L Final   • Chloride 07/18/2022 103  98 - 107 mmol/L Final   • CO2 07/18/2022 27.1  22.0 - 29.0 mmol/L Final   • Calcium 07/18/2022 10.0  8.6 - 10.5 mg/dL Final   • Total Protein 07/18/2022 7.6  6.0 - 8.5 g/dL Final   • Albumin 07/18/2022 4.70  3.50 - 5.20 g/dL Final   • ALT (SGPT) 07/18/2022 11  1 - 33 U/L Final   • AST (SGOT) 07/18/2022 18  1 - 32 U/L Final   • Alkaline Phosphatase 07/18/2022 69  39 - 117 U/L Final   • Total Bilirubin 07/18/2022 0.3  0.0 - 1.2 mg/dL Final   • Globulin 07/18/2022 2.9  gm/dL Final   • A/G Ratio 07/18/2022 1.6  g/dL Final   • BUN/Creatinine Ratio 07/18/2022 11.0  7.0 - 25.0 Final   • Anion Gap 07/18/2022 10.9  5.0 - 15.0 mmol/L Final   • eGFR 07/18/2022 105.2  >60.0 mL/min/1.73 Final    National Kidney Foundation and American Society of Nephrology (ASN) Task Force recommended calculation based on the Chronic Kidney Disease Epidemiology Collaboration (CKD-EPI) equation refit without adjustment for race.   • Magnesium 07/18/2022 1.9  1.7 - 2.2 mg/dL Final   • Troponin T 07/18/2022 <0.010  0.000 - 0.030 ng/mL Final   • Extra Tube 07/18/2022 Hold for add-ons.   Final    Auto resulted.   • Extra Tube 07/18/2022 hold for add-on   Final    Auto resulted   • Extra Tube 07/18/2022 Hold for add-ons.   Final    Auto resulted.    • Extra Tube 07/18/2022 Hold for add-ons.   Final    Auto resulted   • WBC 07/18/2022 12.87 (A) 3.40 - 10.80 10*3/mm3 Final   • RBC 07/18/2022 4.90  3.77 - 5.28 10*6/mm3 Final   • Hemoglobin 07/18/2022 14.5  12.0 - 15.9 g/dL Final   • Hematocrit 07/18/2022 42.9  34.0 - 46.6 % Final   • MCV 07/18/2022 87.6  79.0 - 97.0 fL Final   • MCH 07/18/2022 29.6  26.6 - 33.0 pg Final   • MCHC 07/18/2022 33.8  31.5 - 35.7 g/dL Final   • RDW 07/18/2022 12.6  12.3 - 15.4 % Final   • RDW-SD 07/18/2022 39.9  37.0 - 54.0 fl Final   • MPV 07/18/2022 10.2  6.0 - 12.0 fL Final   • Platelets 07/18/2022 185  140 - 450 10*3/mm3 Final   • Neutrophil % 07/18/2022 78.0 (A) 42.7 - 76.0 % Final   • Lymphocyte % 07/18/2022 14.8 (A) 19.6 - 45.3 % Final   • Monocyte % 07/18/2022 6.2  5.0 - 12.0 % Final   • Eosinophil % 07/18/2022 0.4  0.3 - 6.2 % Final   • Basophil % 07/18/2022 0.4  0.0 - 1.5 % Final   • Immature Grans % 07/18/2022 0.2  0.0 - 0.5 % Final   • Neutrophils, Absolute 07/18/2022 10.05 (A) 1.70 - 7.00 10*3/mm3 Final   • Lymphocytes, Absolute 07/18/2022 1.90  0.70 - 3.10 10*3/mm3 Final   • Monocytes, Absolute 07/18/2022 0.80  0.10 - 0.90 10*3/mm3 Final   • Eosinophils, Absolute 07/18/2022 0.05  0.00 - 0.40 10*3/mm3 Final   • Basophils, Absolute 07/18/2022 0.05  0.00 - 0.20 10*3/mm3 Final   • Immature Grans, Absolute 07/18/2022 0.02  0.00 - 0.05 10*3/mm3 Final   • nRBC 07/18/2022 0.0  0.0 - 0.2 /100 WBC Final   • Color, UA 07/18/2022 Yellow  Yellow, Straw Final   • Appearance, UA 07/18/2022 Clear  Clear Final   • pH, UA 07/18/2022 6.5  5.0 - 8.0 Final   • Specific Gravity, UA 07/18/2022 <=1.005  1.005 - 1.030 Final   • Glucose, UA 07/18/2022 Negative  Negative Final   • Ketones, UA 07/18/2022 Negative  Negative Final   • Bilirubin, UA 07/18/2022 Negative  Negative Final   • Blood, UA 07/18/2022 Negative  Negative Final   • Protein, UA 07/18/2022 Negative  Negative Final   • Leuk Esterase, UA 07/18/2022 Negative  Negative Final   •  Nitrite, UA 07/18/2022 Negative  Negative Final   • Urobilinogen, UA 07/18/2022 0.2 E.U./dL  0.2 - 1.0 E.U./dL Final   • Amphet/Methamphet, Screen 07/18/2022 Negative  Negative Final   • Barbiturates Screen, Urine 07/18/2022 Negative  Negative Final   • Benzodiazepine Screen, Urine 07/18/2022 Negative  Negative Final   • Cocaine Screen, Urine 07/18/2022 Negative  Negative Final   • Opiate Screen 07/18/2022 Negative  Negative Final   • THC, Screen, Urine 07/18/2022 Negative  Negative Final   • Methadone Screen, Urine 07/18/2022 Negative  Negative Final   • Oxycodone Screen, Urine 07/18/2022 Negative  Negative Final   • TSH 07/18/2022 0.713  0.270 - 4.200 uIU/mL Final   • Free T4 07/18/2022 1.58  0.93 - 1.70 ng/dL Final   Office Visit on 05/25/2022   Component Date Value Ref Range Status   • Rapid Influenza A Ag 05/25/2022 Negative  Negative Final   • Rapid Influenza B Ag 05/25/2022 Negative  Negative Final   • Internal Control 05/25/2022 Passed  Passed Final   • Lot Number 05/25/2022 [id number]   Final   • Expiration Date 05/25/2022 05/15/2023   Final   • Rapid Strep A Screen 05/25/2022 Negative  Negative, VALID, INVALID, Not Performed Final   • Internal Control 05/25/2022 Passed  Passed Final   • Lot Number 05/25/2022 [id number]   Final   • Expiration Date 05/25/2022 12/09/2023   Final   • SARS Antigen 05/25/2022 Not Detected  Not Detected, Presumptive Negative Final   • Internal Control 05/25/2022 Passed  Passed Final   • Lot Number 05/25/2022 [id number]   Final   • Expiration Date 05/25/2022 09/12/2023   Final       EKG Results:  No orders to display       Imaging Results:  XR Chest 1 View    Result Date: 7/20/2022    1. No acute cardiopulmonary disease       Derek Garcia M.D.       Electronically Signed and Approved By: Derek Garcia M.D. on 7/20/2022 at 17:44               Assessment & Plan   Diagnoses and all orders for this visit:    1. Panic disorder (Primary)  -     Ambulatory Referral to Behavioral Health    2.  Panic attacks  -     Ambulatory Referral to Behavioral Health    3. Generalized anxiety disorder  -     Ambulatory Referral to Behavioral Health    4. Major depressive disorder, recurrent episode, moderate (HCC)  -     Ambulatory Referral to Behavioral Health    5. Anxiety  -     PARoxetine (Paxil) 20 MG tablet; Take 1 tablet by mouth Every Morning.  Dispense: 30 tablet; Refill: 2        Visit Diagnoses:    ICD-10-CM ICD-9-CM   1. Panic disorder  F41.0 300.01   2. Panic attacks  F41.0 300.01   3. Generalized anxiety disorder  F41.1 300.02   4. Major depressive disorder, recurrent episode, moderate (HCC)  F33.1 296.32   5. Anxiety  F41.9 300.00     9/23: [facility] at [facility], increase Paxil, continue Ativan.  Low threshold to start scheduled gabapentin to target anxiety.  Also consider a mood stabilizer such as Abilify.  4 weeks    PLAN:  13. Safety: No acute safety concerns  14. Therapy: Referral Made  15. Risk Assessment: Risk of self-harm acutely is moderate.  Risk factors include anxiety disorder, mood disorder, some AODA, and recent psychosocial stressors (pandemic). Protective factors include no family history, denies access to guns/weapons, no present SI, no history of suicide attempts or self-harm in the past, healthcare seeking, future orientation, willingness to engage in care.  Risk of self-harm chronically is also moderate, but could be further elevated in the event of treatment noncompliance and/or AODA.  16. Meds:  a. Increase Paxil 10 to 20 mg daily. Risks, benefits, alternatives discussed with patient including GI upset, nausea vomiting diarrhea, theoretical decrease of seizure threshold predisposing the patient to a slightly higher seizure risk, headaches, sexual dysfunction, serotonin syndrome, bleeding risk, increased suicidality in patients 24 years and younger.  After discussion of these risks and benefits, the patient voiced understanding and agreed to proceed.  b. CONTINUE Ativan 0.5 mg  p.o. twice daily. Risks, benefits, alternatives discussed with patient including GI upset, sedation, dizziness, respiratory depression, falls risk.  After discussion of these risks and benefits, the patient voiced understanding and agreed to proceed. Christina ordered. UDS ordered.  17. Labs: None  18. Follow up: 4 weeks    Patient screened positive for depression based on a PHQ-9 score of 0 on 3/9/2022. Follow-up recommendations include: Prescribed antidepressant medication treatment and Suicide Risk Assessment performed.           TREATMENT PLAN/GOALS: Continue supportive psychotherapy efforts and medications as indicated. Treatment and medication options discussed during today's visit. Patient acknowledged and verbally consented to continue with current treatment plan and was educated on the importance of compliance with treatment and follow-up appointments.    MEDICATION ISSUES:  CHRISTINA reviewed as expected.  Discussed medication options and treatment plan of prescribed medication as well as the risks, benefits, and side effects including potential falls, possible impaired driving and metabolic adversities among others. Patient is agreeable to call the office with any worsening of symptoms or onset of side effects. Patient is agreeable to call 911 or go to the nearest ER should he/she begin having SI/HI. No medication side effects or related complaints today.     MEDS ORDERED DURING VISIT:  New Medications Ordered This Visit   Medications   • PARoxetine (Paxil) 20 MG tablet     Sig: Take 1 tablet by mouth Every Morning.     Dispense:  30 tablet     Refill:  2       Return in about 4 weeks (around 10/21/2022) for urgent.         This document has been electronically signed by Jocy Benitez MD  September 23, 2022 09:50 EDT    Dictated Utilizing Dragon Dictation: Part of this note may be an electronic transcription/translation of spoken language to printed text using the Dragon Dictation System.

## 2022-09-23 NOTE — PATIENT INSTRUCTIONS
1.  Please return to clinic at your next scheduled visit.  Contact the clinic ([phone]) at least 24 hours prior in the event you need to cancel.  2.  Do no harm to yourself or others.    3.  Avoid alcohol and drugs.    4.  Take all medications as prescribed.  Please contact the clinic with any concerns. If you are in need of medication refills, please call the clinic at [phone].    5. Should you want to get in touch with your provider, Dr. Jocy Benitez, please utilize [employer] or contact the office ([phone]), and staff will be able to page Dr. Benitez directly.  6.  In the event you have personal crisis, contact the following crisis numbers: Suicide Prevention Hotline 1-601.507.9530; [facility] Helpline 0-968-549-HIMT; [facility] Emergency Room [phone]; text HELLO to 146445; or 492.     SPECIFIC RECOMMENDATIONS:     1.      Medications discussed at this encounter:                   - increase paxil, start therapy     2.      Psychotherapy recommendations:      3.     Return to clinic: 4 weeks

## 2022-09-23 NOTE — TREATMENT PLAN
Multi-Disciplinary Problems (from Behavioral Health Treatment Plan)    Active Problems     Problem: Anxiety  Start Date: 09/23/22    Problem Details: The patient self-scales this problem as a 7 with 10 being the worst.        Goal Priority Start Date Expected End Date End Date    Patient will develop and implement behavioral and cognitive strategies to reduce anxiety and irrational fears. -- 09/23/22 -- --    Goal Details: Progress toward goal:  Not appropriate to rate progress toward goal since this is the initial treatment plan.        Goal Intervention Frequency Start Date End Date    Help patient explore past emotional issues in relation to present anxiety. Q Month 09/23/22 --    Intervention Details: Duration of treatment until until remission of symptoms.        Goal Intervention Frequency Start Date End Date    Help patient develop an awareness of their cognitive and physical responses to anxiety. Q Month 09/23/22 --    Intervention Details: Duration of treatment until until remission of symptoms.              Problem: Depression  Start Date: 09/23/22    Problem Details: The patient self-scales this problem as a 7 with 10 being the worst.        Goal Priority Start Date Expected End Date End Date    Patient will demonstrate the ability to initiate new constructive life skills outside of sessions on a consistent basis. -- 09/23/22 -- --    Goal Details: Progress toward goal:  Not appropriate to rate progress toward goal since this is the initial treatment plan.        Goal Intervention Frequency Start Date End Date    Assist patient in setting attainable activities of daily living goals. PRN 09/23/22 --    Goal Intervention Frequency Start Date End Date    Provide education about depression Q Month 09/23/22 --    Intervention Details: Duration of treatment until until remission of symptoms.        Goal Intervention Frequency Start Date End Date    Assist patient in developing healthy coping strategies. Q Month  09/23/22 --    Intervention Details: Duration of treatment until until remission of symptoms.                    Reviewed By     Jocy Benitez MD 09/23/22 6177                 I have discussed and reviewed this treatment plan with the patient.

## 2022-09-30 ENCOUNTER — OFFICE VISIT (OUTPATIENT)
Dept: FAMILY MEDICINE CLINIC | Facility: CLINIC | Age: 22
End: 2022-09-30

## 2022-09-30 VITALS
BODY MASS INDEX: 26.13 KG/M2 | OXYGEN SATURATION: 98 % | SYSTOLIC BLOOD PRESSURE: 111 MMHG | HEIGHT: 62 IN | DIASTOLIC BLOOD PRESSURE: 58 MMHG | WEIGHT: 142 LBS | TEMPERATURE: 98.4 F | HEART RATE: 108 BPM

## 2022-09-30 DIAGNOSIS — J02.9 SORE THROAT: ICD-10-CM

## 2022-09-30 DIAGNOSIS — R51.9 NONINTRACTABLE HEADACHE, UNSPECIFIED CHRONICITY PATTERN, UNSPECIFIED HEADACHE TYPE: ICD-10-CM

## 2022-09-30 DIAGNOSIS — N92.6 LATE MENSES: ICD-10-CM

## 2022-09-30 DIAGNOSIS — Z79.899 MEDICATION MANAGEMENT: Primary | ICD-10-CM

## 2022-09-30 DIAGNOSIS — J06.9 UPPER RESPIRATORY TRACT INFECTION, UNSPECIFIED TYPE: ICD-10-CM

## 2022-09-30 DIAGNOSIS — R50.9 FEVER, UNSPECIFIED FEVER CAUSE: ICD-10-CM

## 2022-09-30 LAB
B-HCG UR QL: NEGATIVE
EXPIRATION DATE: NORMAL
FLUAV AG NPH QL: NEGATIVE
FLUBV AG NPH QL: NEGATIVE
INTERNAL CONTROL: NORMAL
INTERNAL NEGATIVE CONTROL: NORMAL
INTERNAL POSITIVE CONTROL: NORMAL
Lab: NORMAL
POC AMPHETAMINES: NEGATIVE
POC BARBITURATES: NEGATIVE
POC BENZODIAZEPHINES: POSITIVE
POC COCAINE: NEGATIVE
POC METHADONE: NEGATIVE
POC METHAMPHETAMINE SCREEN URINE: NEGATIVE
POC OPIATES: NEGATIVE
POC OXYCODONE: NEGATIVE
POC PHENCYCLIDINE: NEGATIVE
POC PROPOXYPHENE: NEGATIVE
POC THC: NEGATIVE
POC TRICYCLIC ANTIDEPRESSANTS: NEGATIVE
S PYO AG THROAT QL: NEGATIVE
SARS-COV-2 AG UPPER RESP QL IA.RAPID: NOT DETECTED

## 2022-09-30 PROCEDURE — 81025 URINE PREGNANCY TEST: CPT

## 2022-09-30 PROCEDURE — 87426 SARSCOV CORONAVIRUS AG IA: CPT

## 2022-09-30 PROCEDURE — 99214 OFFICE O/P EST MOD 30 MIN: CPT

## 2022-09-30 PROCEDURE — 80305 DRUG TEST PRSMV DIR OPT OBS: CPT

## 2022-09-30 PROCEDURE — 87804 INFLUENZA ASSAY W/OPTIC: CPT

## 2022-09-30 PROCEDURE — 87880 STREP A ASSAY W/OPTIC: CPT

## 2022-09-30 PROCEDURE — U0004 COV-19 TEST NON-CDC HGH THRU: HCPCS

## 2022-09-30 RX ORDER — METHYLPREDNISOLONE 4 MG/1
TABLET ORAL
Qty: 1 EACH | Refills: 0 | Status: SHIPPED | OUTPATIENT
Start: 2022-09-30 | End: 2022-10-07

## 2022-10-01 LAB — SARS-COV-2 RNA PNL SPEC NAA+PROBE: NOT DETECTED

## 2022-10-07 ENCOUNTER — OFFICE VISIT (OUTPATIENT)
Dept: FAMILY MEDICINE CLINIC | Facility: CLINIC | Age: 22
End: 2022-10-07

## 2022-10-07 ENCOUNTER — HOSPITAL ENCOUNTER (OUTPATIENT)
Dept: GENERAL RADIOLOGY | Facility: HOSPITAL | Age: 22
Discharge: HOME | End: 2022-10-07
Admitting: NURSE PRACTITIONER

## 2022-10-07 ENCOUNTER — TELEPHONE (OUTPATIENT)
Dept: FAMILY MEDICINE CLINIC | Facility: CLINIC | Age: 22
End: 2022-10-07

## 2022-10-07 VITALS
HEART RATE: 72 BPM | SYSTOLIC BLOOD PRESSURE: 109 MMHG | HEIGHT: 62 IN | TEMPERATURE: 98.1 F | RESPIRATION RATE: 16 BRPM | WEIGHT: 140.2 LBS | DIASTOLIC BLOOD PRESSURE: 55 MMHG | OXYGEN SATURATION: 100 % | BODY MASS INDEX: 25.8 KG/M2

## 2022-10-07 DIAGNOSIS — F41.1 GAD (GENERALIZED ANXIETY DISORDER): Chronic | ICD-10-CM

## 2022-10-07 DIAGNOSIS — J30.1 SEASONAL ALLERGIC RHINITIS DUE TO POLLEN: Chronic | ICD-10-CM

## 2022-10-07 DIAGNOSIS — R09.89 CHEST CONGESTION: ICD-10-CM

## 2022-10-07 DIAGNOSIS — J02.9 SORE THROAT: ICD-10-CM

## 2022-10-07 DIAGNOSIS — J02.9 SORE THROAT: Primary | ICD-10-CM

## 2022-10-07 DIAGNOSIS — H92.01 RIGHT EAR PAIN: ICD-10-CM

## 2022-10-07 DIAGNOSIS — J06.9 VIRAL UPPER RESPIRATORY TRACT INFECTION: ICD-10-CM

## 2022-10-07 LAB
EXPIRATION DATE: NORMAL
INTERNAL CONTROL: NORMAL
Lab: NORMAL
S PYO AG THROAT QL: NEGATIVE

## 2022-10-07 PROCEDURE — 99214 OFFICE O/P EST MOD 30 MIN: CPT | Performed by: NURSE PRACTITIONER

## 2022-10-07 PROCEDURE — 87081 CULTURE SCREEN ONLY: CPT | Performed by: NURSE PRACTITIONER

## 2022-10-07 PROCEDURE — 87880 STREP A ASSAY W/OPTIC: CPT | Performed by: NURSE PRACTITIONER

## 2022-10-07 PROCEDURE — 71046 X-RAY EXAM CHEST 2 VIEWS: CPT

## 2022-10-07 RX ORDER — FEXOFENADINE HCL 180 MG/1
180 TABLET ORAL DAILY
Qty: 30 TABLET | Refills: 5 | Status: SHIPPED | OUTPATIENT
Start: 2022-10-07 | End: 2022-10-21

## 2022-10-07 RX ORDER — GUAIFENESIN 600 MG/1
1200 TABLET, EXTENDED RELEASE ORAL 2 TIMES DAILY
Qty: 30 TABLET | Refills: 1 | Status: SHIPPED | OUTPATIENT
Start: 2022-10-07 | End: 2022-10-21

## 2022-10-07 RX ORDER — FLUTICASONE PROPIONATE 50 MCG
2 SPRAY, SUSPENSION (ML) NASAL DAILY
Qty: 1 G | Refills: 5 | Status: SHIPPED | OUTPATIENT
Start: 2022-10-07 | End: 2022-10-21

## 2022-10-07 NOTE — ASSESSMENT & PLAN NOTE
Uncontrolled  Start allegra 180mg PO qd   Start flonase UAD qd   Start mucinex 1200mg PO bid   Avoid allergy triggers

## 2022-10-07 NOTE — PROGRESS NOTES
"Chief Complaint  Sore Throat, Sinusitis, and Earache    Subjective        Kristina Parson presents to [facility]  History of Present Illness    Objective   Vital Signs:  /55   Pulse 72   Temp 98.1 °F (36.7 °C)   Resp 16   Ht 157.5 cm (62\")   Wt 63.6 kg (140 lb 3.2 oz)   SpO2 100%   BMI 25.64 kg/m²   Estimated body mass index is 25.64 kg/m² as calculated from the following:    Height as of this encounter: 157.5 cm (62\").    Weight as of this encounter: 63.6 kg (140 lb 3.2 oz).          Physical Exam  Vitals reviewed.   Constitutional:       General: She is not in acute distress.     Appearance: Normal appearance.   HENT:      Head: Normocephalic.      Right Ear: Tympanic membrane normal. Drainage present.      Left Ear: Tympanic membrane normal.      Nose: Congestion present.      Mouth/Throat:      Pharynx: Oropharynx is clear. Posterior oropharyngeal erythema present.   Eyes:      General: No scleral icterus.     Extraocular Movements: Extraocular movements intact.      Conjunctiva/sclera: Conjunctivae normal.      Pupils: Pupils are equal, round, and reactive to light.   Cardiovascular:      Rate and Rhythm: Normal rate and regular rhythm.      Pulses: Normal pulses.      Heart sounds: Normal heart sounds.   Pulmonary:      Effort: Pulmonary effort is normal.      Breath sounds: Normal breath sounds.   Abdominal:      General: Bowel sounds are normal.      Palpations: Abdomen is soft.   Musculoskeletal:         General: Normal range of motion.      Cervical back: Neck supple.   Skin:     General: Skin is warm and dry.   Neurological:      Mental Status: She is alert and oriented to person, place, and time.   Psychiatric:         Mood and Affect: Mood normal.         Behavior: Behavior normal.         Thought Content: Thought content normal.         Judgment: Judgment normal.        Result Review :    Common labs    Common Labs 7/18/22 7/18/22 7/20/22 7/20/22    5495 9783 " 1430 1430   Glucose  102 (A)  87   BUN  9  12   Creatinine  0.82  0.88   Sodium  141  142   Potassium  3.6  3.9   Chloride  103  104   Calcium  10.0  10.1   Albumin  4.70  5.10   Total Bilirubin  0.3  0.4   Alkaline Phosphatase  69  68   AST (SGOT)  18  17   ALT (SGPT)  11  11   WBC 12.87 (A)  11.99 (A)    Hemoglobin 14.5  15.0    Hematocrit 42.9  45.2    Platelets 185  217    (A) Abnormal value                      Assessment and Plan   Diagnoses and all orders for this visit:    1. Sore throat (Primary)  Comments:  Rapid strep negative   Salt water gargles tid PRN   Orders:  -     POCT rapid strep A  -     XR Chest PA & Lateral; Future    2. Viral upper respiratory tract infection  Comments:  Increase rest / clear fluids   Tyl/Ibu UAD prn     3. Right ear pain  Comments:  Tyl/Ibu UAD prn pain     4. POLI (generalized anxiety disorder)  Assessment & Plan:  Psychological condition is unchanged.  Continue current treatment regimen.  Regular aerobic exercise.  Psychological condition  will be reassessed at the next regular appointment.      5. Seasonal allergic rhinitis due to pollen  Assessment & Plan:  Uncontrolled  Start allegra 180mg PO qd   Start flonase UAD qd   Start mucinex 1200mg PO bid   Avoid allergy triggers         6. Chest congestion  Comments:  CXR   Mucinex UAD     Other orders  -     fexofenadine (Allegra Allergy) 180 MG tablet; Take 1 tablet by mouth Daily.  Dispense: 30 tablet; Refill: 5  -     fluticasone (Flonase) 50 MCG/ACT nasal spray; 2 sprays into the nostril(s) as directed by provider Daily.  Dispense: 1 g; Refill: 5  -     guaiFENesin (Mucinex) 600 MG 12 hr tablet; Take 2 tablets by mouth 2 (Two) Times a Day.  Dispense: 30 tablet; Refill: 1           Follow Up   No follow-ups on file.  Patient was given instructions and counseling regarding her condition or for health maintenance advice. Please see specific information pulled into the AVS if appropriate.

## 2022-10-07 NOTE — TELEPHONE ENCOUNTER
Caller: LIVIER CHEN    Relationship: Emergency Contact    Best call back number:  [phone]       What test was performed: X-RAY      Additional notes:     THE PATIENT'S GRAND MOTHER IS CALLING TO OBTAIN THE PATIENT'S  X-RAY TEST RESULT.       PLEASE CALL AND ADVISE

## 2022-10-09 LAB — BACTERIA SPEC AEROBE CULT: NORMAL

## 2022-10-11 PROCEDURE — 86308 HETEROPHILE ANTIBODY SCREEN: CPT | Performed by: EMERGENCY MEDICINE

## 2022-10-11 PROCEDURE — 87081 CULTURE SCREEN ONLY: CPT | Performed by: EMERGENCY MEDICINE

## 2022-10-12 ENCOUNTER — TELEPHONE (OUTPATIENT)
Dept: URGENT CARE | Facility: CLINIC | Age: 22
End: 2022-10-12

## 2022-10-12 NOTE — TELEPHONE ENCOUNTER
----- Message from KAYLAN Liang sent at 10/12/2022 12:14 PM EDT -----  Please notify patient of negative mono test result.

## 2022-10-13 ENCOUNTER — TELEPHONE (OUTPATIENT)
Dept: URGENT CARE | Facility: CLINIC | Age: 22
End: 2022-10-13

## 2022-10-13 NOTE — TELEPHONE ENCOUNTER
----- Message from KAYLAN Michel sent at 10/13/2022  9:38 AM EDT -----  Please call the patient regarding her negative strep culture

## 2022-10-21 ENCOUNTER — TELEMEDICINE (OUTPATIENT)
Dept: PSYCHIATRY | Facility: CLINIC | Age: 22
End: 2022-10-21

## 2022-10-21 ENCOUNTER — TELEPHONE (OUTPATIENT)
Dept: PSYCHIATRY | Facility: CLINIC | Age: 22
End: 2022-10-21

## 2022-10-21 DIAGNOSIS — F41.9 ANXIETY: ICD-10-CM

## 2022-10-21 DIAGNOSIS — F33.1 MAJOR DEPRESSIVE DISORDER, RECURRENT EPISODE, MODERATE: ICD-10-CM

## 2022-10-21 DIAGNOSIS — F41.0 PANIC DISORDER: Primary | ICD-10-CM

## 2022-10-21 DIAGNOSIS — F41.0 PANIC ATTACKS: ICD-10-CM

## 2022-10-21 DIAGNOSIS — F41.1 GENERALIZED ANXIETY DISORDER: ICD-10-CM

## 2022-10-21 PROCEDURE — 99214 OFFICE O/P EST MOD 30 MIN: CPT | Performed by: STUDENT IN AN ORGANIZED HEALTH CARE EDUCATION/TRAINING PROGRAM

## 2022-10-21 RX ORDER — PAROXETINE 30 MG/1
30 TABLET, FILM COATED ORAL EVERY MORNING
Qty: 30 TABLET | Refills: 2 | Status: SHIPPED | OUTPATIENT
Start: 2022-10-21 | End: 2022-12-21 | Stop reason: SDUPTHER

## 2022-10-21 NOTE — PATIENT INSTRUCTIONS
1.  Please return to clinic at your next scheduled visit.  Contact the clinic ([phone]) at least 24 hours prior in the event you need to cancel.  2.  Do no harm to yourself or others.    3.  Avoid alcohol and drugs.    4.  Take all medications as prescribed.  Please contact the clinic with any concerns. If you are in need of medication refills, please call the clinic at [phone].    5. Should you want to get in touch with your provider, Dr. Jocy Benitez, please utilize [employer] or contact the office ([phone]), and staff will be able to page Dr. Benitez directly.  6.  In the event you have personal crisis, contact the following crisis numbers: Suicide Prevention Hotline 1-991.736.4397; [facility] Helpline 6-376-225-ENNT; [facility] Emergency Room [phone]; text HELLO to [phone]; or 669.     SPECIFIC RECOMMENDATIONS:     1.      Medications discussed at this encounter:                   - increase paxil     2.      Psychotherapy recommendations:      3.     Return to clinic: 6 weeks

## 2022-12-20 DIAGNOSIS — F41.9 ANXIETY: ICD-10-CM

## 2022-12-21 DIAGNOSIS — F41.9 ANXIETY: ICD-10-CM

## 2022-12-21 RX ORDER — PAROXETINE HYDROCHLORIDE 20 MG/1
20 TABLET, FILM COATED ORAL EVERY MORNING
Qty: 30 TABLET | Refills: 2 | OUTPATIENT
Start: 2022-12-21

## 2022-12-21 RX ORDER — PAROXETINE 30 MG/1
30 TABLET, FILM COATED ORAL EVERY MORNING
Qty: 30 TABLET | Refills: 2 | Status: SHIPPED | OUTPATIENT
Start: 2022-12-21 | End: 2022-12-22

## 2022-12-21 NOTE — TELEPHONE ENCOUNTER
MEDICATION REFILL REQUEST FOR PAXIL 20MG.     MEDICATION DOSAGE WAS INCREASED TO 30MG.     MEDICATION REFUSED.

## 2022-12-22 NOTE — TELEPHONE ENCOUNTER
Patient states she has been taking 20mg Paxil and asking for that to be sent to the pharmacy.  30mg was sent yesterday.  Med pended.

## 2022-12-23 RX ORDER — PAROXETINE HYDROCHLORIDE 20 MG/1
20 TABLET, FILM COATED ORAL EVERY MORNING
Qty: 90 TABLET | Refills: 4 | Status: SHIPPED | OUTPATIENT
Start: 2022-12-23 | End: 2023-02-21

## 2022-12-27 ENCOUNTER — OFFICE VISIT (OUTPATIENT)
Dept: FAMILY MEDICINE CLINIC | Facility: CLINIC | Age: 22
End: 2022-12-27
Payer: COMMERCIAL

## 2022-12-27 VITALS
WEIGHT: 151.3 LBS | HEART RATE: 74 BPM | OXYGEN SATURATION: 97 % | BODY MASS INDEX: 27.84 KG/M2 | SYSTOLIC BLOOD PRESSURE: 120 MMHG | DIASTOLIC BLOOD PRESSURE: 61 MMHG | HEIGHT: 62 IN

## 2022-12-27 DIAGNOSIS — H81.10 BENIGN PAROXYSMAL POSITIONAL VERTIGO, UNSPECIFIED LATERALITY: Chronic | ICD-10-CM

## 2022-12-27 DIAGNOSIS — F41.1 GAD (GENERALIZED ANXIETY DISORDER): Primary | Chronic | ICD-10-CM

## 2022-12-27 DIAGNOSIS — R00.0 TACHYCARDIA: ICD-10-CM

## 2022-12-27 DIAGNOSIS — J30.1 SEASONAL ALLERGIC RHINITIS DUE TO POLLEN: Chronic | ICD-10-CM

## 2022-12-27 PROBLEM — N93.9 ABNORMAL UTERINE BLEEDING (AUB): Status: RESOLVED | Noted: 2021-07-11 | Resolved: 2022-12-27

## 2022-12-27 PROBLEM — F41.9 ANXIETY: Chronic | Status: ACTIVE | Noted: 2022-12-27

## 2022-12-27 PROBLEM — Z30.41 ENCOUNTER FOR SURVEILLANCE OF CONTRACEPTIVE PILLS: Status: RESOLVED | Noted: 2021-11-23 | Resolved: 2022-12-27

## 2022-12-27 PROBLEM — F41.9 ANXIETY: Chronic | Status: RESOLVED | Noted: 2022-12-27 | Resolved: 2022-12-27

## 2022-12-27 PROBLEM — J06.9 URI (UPPER RESPIRATORY INFECTION): Status: RESOLVED | Noted: 2021-11-08 | Resolved: 2022-12-27

## 2022-12-27 PROBLEM — R42 VERTIGO: Status: RESOLVED | Noted: 2022-12-27 | Resolved: 2022-12-27

## 2022-12-27 PROBLEM — H66.90 OTITIS MEDIA: Status: RESOLVED | Noted: 2021-08-05 | Resolved: 2022-12-27

## 2022-12-27 PROBLEM — R42 VERTIGO: Status: ACTIVE | Noted: 2022-12-27

## 2022-12-27 PROBLEM — G43.909 MIGRAINE: Status: RESOLVED | Noted: 2021-08-05 | Resolved: 2022-12-27

## 2022-12-27 PROCEDURE — 1159F MED LIST DOCD IN RCRD: CPT | Performed by: FAMILY MEDICINE

## 2022-12-27 PROCEDURE — 1160F RVW MEDS BY RX/DR IN RCRD: CPT | Performed by: FAMILY MEDICINE

## 2022-12-27 PROCEDURE — 99214 OFFICE O/P EST MOD 30 MIN: CPT | Performed by: FAMILY MEDICINE

## 2022-12-27 NOTE — PROGRESS NOTES
Chief Complaint  Follow-up (1 week  follow up on medications. Pt states she was seeing therapist for her paxil, was given 30mg but she felt like she didn't need that high of a dose.We sent in 20mg on 12/23/2022 and is doing better on that.)    Jeanne Parson presents to [facility]  History of Present Illness  The patient presents for follow-up on chronic conditions. She is on Paxil for anxiety and Ativan as needed. She follows up with psychiatry, doing well overall with condition. Metoprolol is helping with heart rate and managing symptoms. Additionally, weight is stable, with her BMI at 27.1 or 27.7 percent. Blood pressure is 120/61 mmHg. She believes the 30 mg of Paxil was too much, so she is just taking 20 mg and has had this already refilled. The patient does not need anything else.    The patient reports that she is doing well overall. She states that she has not been getting out of the house because she has a puppy. She notes that she has been having problems with her vertigo, which she thinks is the weather. She adds that she needs to start doing her maneuvers, but she has not been doing them due to episodes being in the mornings upon wake up. She denies taking any allergy medicine.          Objective   Vital Signs:  /61   Pulse 74   Ht 157.5 cm (62\")   Wt 68.6 kg (151 lb 4.8 oz)   SpO2 97%   BMI 27.67 kg/m²   Estimated body mass index is 27.67 kg/m² as calculated from the following:    Height as of this encounter: 157.5 cm (62\").    Weight as of this encounter: 68.6 kg (151 lb 4.8 oz).    BMI is >= 25 and <30. (Overweight) The following options were offered after discussion;: exercise counseling/recommendations      Physical Exam  Vitals reviewed.   Constitutional:       Appearance: Normal appearance. She is well-developed.   HENT:      Head: Normocephalic and atraumatic.      Right Ear: External ear normal.      Left Ear: External ear normal.       Nose: Nose normal.   Eyes:      Conjunctiva/sclera: Conjunctivae normal.      Pupils: Pupils are equal, round, and reactive to light.   Cardiovascular:      Rate and Rhythm: Normal rate.   Pulmonary:      Effort: Pulmonary effort is normal.      Breath sounds: Normal breath sounds.   Abdominal:      General: There is no distension.   Skin:     General: Skin is warm and dry.   Neurological:      General: No focal deficit present.      Mental Status: She is alert and oriented to person, place, and time.   Psychiatric:         Mood and Affect: Mood and affect normal.         Behavior: Behavior normal.         Thought Content: Thought content normal.         Judgment: Judgment normal.        Result Review :  The following data was reviewed by: Nils Myers DO on 12/27/2022:  Common labs    Common Labs 7/18/22 7/18/22 7/20/22 7/20/22    1855 1855 1430 1430   Glucose  102 (A)  87   BUN  9  12   Creatinine  0.82  0.88   Sodium  141  142   Potassium  3.6  3.9   Chloride  103  104   Calcium  10.0  10.1   Albumin  4.70  5.10   Total Bilirubin  0.3  0.4   Alkaline Phosphatase  69  68   AST (SGOT)  18  17   ALT (SGPT)  11  11   WBC 12.87 (A)  11.99 (A)    Hemoglobin 14.5  15.0    Hematocrit 42.9  45.2    Platelets 185  217    (A) Abnormal value                     Assessment and Plan   Diagnoses and all orders for this visit:    1. POLI (generalized anxiety disorder) (Primary)    2. Seasonal allergic rhinitis due to pollen    3. Benign paroxysmal positional vertigo, unspecified laterality    4. Tachycardia      POLI anxiety disorder, BPPV I.e vertigo  tachycardia, allergic rhinitis   - Medications as prescribed.   - Follow up with psychiatry as scheduled.   - She is on Paxil 20 mg and Ativan as needed, which she approximately takes 1.5 daily.  - No worsening signs or symptoms.   - Having vertigo flareups due to weather changes.   - Recommended continuing with exercises at home.   - Can take allergy medicine additionally and  follow up if no improvement or worse.   - See her back at least every 6 to 12 months, sooner if worsening signs or symptoms and we will review physical annually.         Follow Up   Return in about 6 months (around 6/27/2023), or if symptoms worsen or fail to improve, for Next scheduled follow up, Physical.  Patient was given instructions and counseling regarding her condition or for health maintenance advice. Please see specific information pulled into the AVS if appropriate.

## 2022-12-31 DIAGNOSIS — F41.1 GAD (GENERALIZED ANXIETY DISORDER): ICD-10-CM

## 2023-01-01 RX ORDER — LORAZEPAM 0.5 MG/1
0.75 TABLET ORAL EVERY 8 HOURS PRN
Qty: 135 TABLET | Refills: 2 | Status: SHIPPED | OUTPATIENT
Start: 2023-01-01 | End: 2023-03-23

## 2023-01-27 ENCOUNTER — TELEPHONE (OUTPATIENT)
Dept: FAMILY MEDICINE CLINIC | Facility: CLINIC | Age: 23
End: 2023-01-27
Payer: COMMERCIAL

## 2023-01-27 ENCOUNTER — OFFICE VISIT (OUTPATIENT)
Dept: FAMILY MEDICINE CLINIC | Facility: CLINIC | Age: 23
End: 2023-01-27
Payer: COMMERCIAL

## 2023-01-27 VITALS
WEIGHT: 149.8 LBS | HEIGHT: 62 IN | DIASTOLIC BLOOD PRESSURE: 75 MMHG | TEMPERATURE: 97.2 F | SYSTOLIC BLOOD PRESSURE: 119 MMHG | HEART RATE: 89 BPM | BODY MASS INDEX: 27.57 KG/M2 | OXYGEN SATURATION: 100 %

## 2023-01-27 DIAGNOSIS — R09.81 NASAL CONGESTION: Primary | ICD-10-CM

## 2023-01-27 DIAGNOSIS — F41.1 GAD (GENERALIZED ANXIETY DISORDER): Chronic | ICD-10-CM

## 2023-01-27 DIAGNOSIS — J01.10 ACUTE NON-RECURRENT FRONTAL SINUSITIS: ICD-10-CM

## 2023-01-27 LAB
EXPIRATION DATE: NORMAL
FLUAV AG UPPER RESP QL IA.RAPID: NOT DETECTED
FLUBV AG UPPER RESP QL IA.RAPID: NOT DETECTED
INTERNAL CONTROL: NORMAL
Lab: NORMAL
SARS-COV-2 AG UPPER RESP QL IA.RAPID: NOT DETECTED

## 2023-01-27 PROCEDURE — 99214 OFFICE O/P EST MOD 30 MIN: CPT | Performed by: NURSE PRACTITIONER

## 2023-01-27 PROCEDURE — 87428 SARSCOV & INF VIR A&B AG IA: CPT | Performed by: NURSE PRACTITIONER

## 2023-01-27 RX ORDER — AZITHROMYCIN 250 MG/1
TABLET, FILM COATED ORAL
Qty: 6 TABLET | Refills: 0 | Status: SHIPPED | OUTPATIENT
Start: 2023-01-27 | End: 2023-02-21

## 2023-01-27 RX ORDER — METHYLPREDNISOLONE 4 MG/1
TABLET ORAL
Qty: 21 TABLET | Refills: 0 | Status: SHIPPED | OUTPATIENT
Start: 2023-01-27 | End: 2023-02-21

## 2023-01-27 NOTE — TELEPHONE ENCOUNTER
Caller: Kristina Parson    Relationship: Self    Best call back number: [phone]    What medication are you requesting: FOR SYMPTOMS    What are your current symptoms: CONGESTION, GREEN MUCUS    How long have you been experiencing symptoms: 3 DAYS    Have you had these symptoms before:    [] Yes  [x] No    Have you been treated for these symptoms before:   [] Yes  [x] No    If a prescription is needed, what is your preferred pharmacy and phone number:      [facility] [address]. [place], [place] - [facility].  [phone] [facility] [phone]     Additional notes: PATIENT IS REQUESTING MEDICATION FOR HER SYMPTOMS. SHE BELIEVES SHE HAS A SINUS INFECTION.

## 2023-01-27 NOTE — ASSESSMENT & PLAN NOTE
Psychological condition is unchanged.  Regular aerobic exercise.  Psychological condition  will be reassessed at the next regular appointment. Use Ativan as sparingly as possible d/t risk of long-term recurrent use.

## 2023-01-27 NOTE — PROGRESS NOTES
"Chief Complaint  Nasal Congestion (Symptoms started 3 days ago) and GREEN MUCOUS    Subjective        Kristina Parson presents to [facility]  History of Present Illness  Pt presents with nasal congestion, sinus pain/pressure, HA , green mucous production x 3 days. Denies fever, chills, SOB, chest pain, sore throat, n/v/d, or other. She has not taken anything to treat.     Reviewed all recent labs and medications.      Objective   Vital Signs:  /75   Pulse 89   Temp 97.2 °F (36.2 °C) (Temporal)   Ht 157.5 cm (62\")   Wt 67.9 kg (149 lb 12.8 oz)   SpO2 100%   BMI 27.40 kg/m²   Estimated body mass index is 27.4 kg/m² as calculated from the following:    Height as of this encounter: 157.5 cm (62\").    Weight as of this encounter: 67.9 kg (149 lb 12.8 oz).  Facility age limit for growth percentiles is 20 years.    BMI is >= 25 and <30. (Overweight) The following options were offered after discussion;: exercise counseling/recommendations and nutrition counseling/recommendations      Physical Exam  Vitals reviewed.   Constitutional:       General: She is not in acute distress.     Appearance: Normal appearance.   HENT:      Head: Normocephalic.      Right Ear: Tympanic membrane normal.      Left Ear: Tympanic membrane normal.      Nose: Congestion present.      Right Sinus: Frontal sinus tenderness present.      Left Sinus: Frontal sinus tenderness present.      Mouth/Throat:      Pharynx: Pharyngeal swelling and posterior oropharyngeal erythema present.      Tonsils: 1+ on the right. 1+ on the left.   Eyes:      General: No scleral icterus.     Extraocular Movements: Extraocular movements intact.      Conjunctiva/sclera: Conjunctivae normal.      Pupils: Pupils are equal, round, and reactive to light.   Cardiovascular:      Rate and Rhythm: Normal rate and regular rhythm.      Pulses: Normal pulses.      Heart sounds: Normal heart sounds.   Pulmonary:      Effort: Pulmonary effort " is normal.      Breath sounds: Normal breath sounds.   Abdominal:      General: Bowel sounds are normal.      Palpations: Abdomen is soft.   Musculoskeletal:         General: Normal range of motion.      Cervical back: Neck supple.   Skin:     General: Skin is warm and dry.   Neurological:      Mental Status: She is alert and oriented to person, place, and time.   Psychiatric:         Mood and Affect: Mood normal.         Behavior: Behavior normal.         Thought Content: Thought content normal.         Judgment: Judgment normal.        Result Review :    Common labs    Common Labs 7/18/22 7/18/22 7/20/22 7/20/22    1855 1855 1430 1430   Glucose  102 (A)  87   BUN  9  12   Creatinine  0.82  0.88   Sodium  141  142   Potassium  3.6  3.9   Chloride  103  104   Calcium  10.0  10.1   Albumin  4.70  5.10   Total Bilirubin  0.3  0.4   Alkaline Phosphatase  69  68   AST (SGOT)  18  17   ALT (SGPT)  11  11   WBC 12.87 (A)  11.99 (A)    Hemoglobin 14.5  15.0    Hematocrit 42.9  45.2    Platelets 185  217    (A) Abnormal value                         Assessment and Plan   Diagnoses and all orders for this visit:    1. Nasal congestion (Primary)  Comments:  rapid covid/ rapid flu negative     Orders:  -     POCT SARS-CoV-2 Antigen MIKE + Flu    2. Acute non-recurrent frontal sinusitis  Comments:  zpack UAD   medrol dose twin uad   Increase rest/clear fluids     3. POLI (generalized anxiety disorder)  Assessment & Plan:  Psychological condition is unchanged.  Regular aerobic exercise.  Psychological condition  will be reassessed at the next regular appointment. Use Ativan as sparingly as possible d/t risk of long-term recurrent use.       Other orders  -     azithromycin (Zithromax Z-Twin) 250 MG tablet; UAD  Dispense: 6 tablet; Refill: 0  -     methylPREDNISolone (MEDROL) 4 MG dose pack; Take as directed on package instructions.  Dispense: 21 tablet; Refill: 0           Follow Up   Return if symptoms worsen or fail to  improve.  Patient was given instructions and counseling regarding her condition or for health maintenance advice. Please see specific information pulled into the AVS if appropriate.

## 2023-02-21 ENCOUNTER — OFFICE VISIT (OUTPATIENT)
Dept: FAMILY MEDICINE CLINIC | Facility: CLINIC | Age: 23
End: 2023-02-21
Payer: COMMERCIAL

## 2023-02-21 ENCOUNTER — DOCUMENTATION (OUTPATIENT)
Dept: PSYCHIATRY | Facility: CLINIC | Age: 23
End: 2023-02-21
Payer: COMMERCIAL

## 2023-02-21 ENCOUNTER — LAB (OUTPATIENT)
Dept: LAB | Facility: HOSPITAL | Age: 23
End: 2023-02-21
Payer: COMMERCIAL

## 2023-02-21 VITALS
HEIGHT: 62 IN | OXYGEN SATURATION: 99 % | HEART RATE: 93 BPM | RESPIRATION RATE: 14 BRPM | TEMPERATURE: 98.6 F | SYSTOLIC BLOOD PRESSURE: 101 MMHG | BODY MASS INDEX: 27.64 KG/M2 | WEIGHT: 150.2 LBS | DIASTOLIC BLOOD PRESSURE: 83 MMHG

## 2023-02-21 DIAGNOSIS — Z3A.01 LESS THAN 8 WEEKS GESTATION OF PREGNANCY: Primary | ICD-10-CM

## 2023-02-21 DIAGNOSIS — Z11.59 ENCOUNTER FOR HEPATITIS C SCREENING TEST FOR LOW RISK PATIENT: ICD-10-CM

## 2023-02-21 DIAGNOSIS — R00.0 TACHYCARDIA: ICD-10-CM

## 2023-02-21 DIAGNOSIS — F41.1 GAD (GENERALIZED ANXIETY DISORDER): ICD-10-CM

## 2023-02-21 DIAGNOSIS — Z3A.01 LESS THAN 8 WEEKS GESTATION OF PREGNANCY: ICD-10-CM

## 2023-02-21 DIAGNOSIS — F41.0 PANIC ATTACKS: ICD-10-CM

## 2023-02-21 LAB
B-HCG UR QL: POSITIVE
EXPIRATION DATE: ABNORMAL
HCG SERPL QL: POSITIVE
INTERNAL NEGATIVE CONTROL: NEGATIVE
INTERNAL POSITIVE CONTROL: POSITIVE
Lab: ABNORMAL

## 2023-02-21 PROCEDURE — 84703 CHORIONIC GONADOTROPIN ASSAY: CPT

## 2023-02-21 PROCEDURE — 36415 COLL VENOUS BLD VENIPUNCTURE: CPT

## 2023-02-21 RX ORDER — PAROXETINE 10 MG/1
10 TABLET, FILM COATED ORAL EVERY MORNING
Qty: 15 TABLET | Refills: 0 | Status: SHIPPED | OUTPATIENT
Start: 2023-02-21 | End: 2023-02-21 | Stop reason: SDUPTHER

## 2023-02-21 RX ORDER — PRENATAL VIT/IRON FUM/FOLIC AC 27 MG-1 MG
1 TABLET ORAL DAILY
Qty: 90 EACH | Refills: 2 | Status: SHIPPED | OUTPATIENT
Start: 2023-02-21

## 2023-02-21 RX ORDER — PAROXETINE 10 MG/1
5 TABLET, FILM COATED ORAL EVERY MORNING
Qty: 10 TABLET | Refills: 0 | Status: SHIPPED | OUTPATIENT
Start: 2023-03-08 | End: 2023-02-27

## 2023-02-21 NOTE — PROGRESS NOTES
Received word patient is pregnant. Office contacted patient for  psych appnt, but pt politely declined.

## 2023-02-22 ENCOUNTER — TELEPHONE (OUTPATIENT)
Dept: PSYCHIATRY | Facility: CLINIC | Age: 23
End: 2023-02-22
Payer: COMMERCIAL

## 2023-02-22 NOTE — TELEPHONE ENCOUNTER
CALLED PATIENT TO SCHEDULE AN APPOINTMENT WITH PROVIDER AND PATIENT STATED THAT SHE DID NOT WANT TO SCHEDULE AN APPOINTMENT. PATIENT WAS INFORMED THAT IF SHE WISHED TO SCHEDULE SHE COULD CONTACT THE OFFICE -[phone].

## 2023-02-27 RX ORDER — PAROXETINE 10 MG/1
5 TABLET, FILM COATED ORAL EVERY MORNING
Qty: 10 TABLET | Refills: 0 | Status: SHIPPED | OUTPATIENT
Start: 2023-02-27

## 2023-03-14 LAB
EXTERNAL HEPATITIS B SURFACE ANTIGEN: NEGATIVE
EXTERNAL RUBELLA QUALITATIVE: NORMAL
EXTERNAL SYPHILIS ANTIBODY: NONREACTIVE
HIV1 P24 AG SERPL QL IA: NORMAL

## 2023-03-23 ENCOUNTER — TELEMEDICINE (OUTPATIENT)
Dept: FAMILY MEDICINE CLINIC | Facility: CLINIC | Age: 23
End: 2023-03-23
Payer: COMMERCIAL

## 2023-03-23 VITALS — WEIGHT: 150 LBS | BODY MASS INDEX: 27.44 KG/M2

## 2023-03-23 DIAGNOSIS — Z51.81 MEDICATION MONITORING ENCOUNTER: ICD-10-CM

## 2023-03-23 DIAGNOSIS — F41.1 GAD (GENERALIZED ANXIETY DISORDER): Primary | Chronic | ICD-10-CM

## 2023-03-23 DIAGNOSIS — F41.0 PANIC: ICD-10-CM

## 2023-03-23 DIAGNOSIS — Z3A.10 10 WEEKS GESTATION OF PREGNANCY: ICD-10-CM

## 2023-03-23 RX ORDER — LORAZEPAM 0.5 MG/1
0.5 TABLET ORAL 2 TIMES DAILY
Qty: 15 TABLET | Refills: 0 | Status: SHIPPED
Start: 2023-03-23 | End: 2023-03-30

## 2023-03-23 RX ORDER — PROMETHAZINE HYDROCHLORIDE 12.5 MG/1
TABLET ORAL
COMMUNITY
Start: 2023-03-16

## 2023-03-23 RX ORDER — ONDANSETRON 4 MG/1
4 TABLET, ORALLY DISINTEGRATING ORAL EVERY 8 HOURS PRN
Qty: 15 TABLET | Refills: 0 | Status: SHIPPED | OUTPATIENT
Start: 2023-03-23

## 2023-03-23 RX ORDER — DOCUSATE SODIUM 100 MG/1
CAPSULE, LIQUID FILLED ORAL
COMMUNITY
Start: 2023-03-16

## 2023-03-23 NOTE — PROGRESS NOTES
"Chief Complaint  Medication Problem (Patient is pregnant wants to change ativan)    Subjective    {Problem List  Visit Diagnosis   Encounters  Notes  Medications  Labs  Result Review Imaging  Media :23}    Kristina Parson presents to [facility]  History of Present Illness    ljK    Objective   Vital Signs:  Wt 68 kg (150 lb)   BMI 27.44 kg/m²   Estimated body mass index is 27.44 kg/m² as calculated from the following:    Height as of 2/21/23: 157.5 cm (62\").    Weight as of this encounter: 68 kg (150 lb).  Facility age limit for growth percentiles is 20 years.    {BMI is >= 25 and <30. (Overweight) The following options were offered after discussion; (Optional):39888}      Physical Exam   Result Review :{Labs  Result Review  Imaging  Med Tab  Media  Procedures  :23}  {The following data was reviewed by (Optional):46690}  {Ambulatory Labs (Optional):40289}  {Data reviewed (Optional):20083:::1}             Assessment and Plan {CC Problem List  Visit Diagnosis   ROS  Review (Popup)  Health Maintenance  Quality  BestPractice  Medications  SmartSets  SnapShot Encounters  Media :23}  There are no diagnoses linked to this encounter.       {Time Spent (Optional):85901}  Follow Up {Instructions Charge Capture  Follow-up Communications :23}  No follow-ups on file.  Patient was given instructions and counseling regarding her condition or for health maintenance advice. Please see specific information pulled into the AVS if appropriate.       "

## 2023-03-23 NOTE — PROGRESS NOTES
"Chief Complaint  Medication Problem (Patient is pregnant wants to change ativan)    Subjective         Kristina Parson presents to [facility]  History of Present Illness     Weaning off ativan, down to 1/2 tab 2 times daily this week and cutting down to 1/2 tab daily next week or so, alternatively fluoxetine and folic acid could help and other strategies, conservative therapies could help manage panic anxiety symptoms, stable so far.       Objective   Vital Signs:   Wt 68 kg (150 lb)   BMI 27.44 kg/m²     Estimated body mass index is 27.44 kg/m² as calculated from the following:    Height as of 2/21/23: 157.5 cm (62\").    Weight as of this encounter: 68 kg (150 lb).  Facility age limit for growth percentiles is 20 years.  Physical Exam   Constitutional: She appears well-developed and well-nourished.   HENT:   Head: Normocephalic.   Eyes: Pupils are equal, round, and reactive to light.   Pulmonary/Chest: Effort normal.   Neurological: She is alert.   Skin: Skin is warm.   Psychiatric: She has a normal mood and affect.     Result Review :   The following data was reviewed by: Nils Myers DO on 03/23/2023:  Common labs    Common Labs 7/18/22 7/18/22 7/20/22 7/20/22    1855 1855 1430 1430   Glucose  102 (A)  87   BUN  9  12   Creatinine  0.82  0.88   Sodium  141  142   Potassium  3.6  3.9   Chloride  103  104   Calcium  10.0  10.1   Albumin  4.70  5.10   Total Bilirubin  0.3  0.4   Alkaline Phosphatase  69  68   AST (SGOT)  18  17   ALT (SGPT)  11  11   WBC 12.87 (A)  11.99 (A)    Hemoglobin 14.5  15.0    Hematocrit 42.9  45.2    Platelets 185  217    (A) Abnormal value            Data reviewed: OB notes and recommendations          Assessment and Plan    Diagnoses and all orders for this visit:    1. POLI (generalized anxiety disorder) (Primary)  -     LORazepam (Ativan) 0.5 MG tablet; Take 1 tablet by mouth 2 (Two) Times a Day for 7 days. Weaning down slowly for pregnancy, 10 weeks " currently, alternative ssri or anxiety meds discussed and planned, still on paxil okayed by OB  Dispense: 15 tablet; Refill: 0    2. 10 weeks gestation of pregnancy    3. Panic    4. Medication monitoring encounter    Other orders  -     ondansetron ODT (ZOFRAN-ODT) 4 MG disintegrating tablet; Place 1 tablet on the tongue Every 8 (Eight) Hours As Needed for Nausea or Vomiting.  Dispense: 15 tablet; Refill: 0        Follow Up   Return in about 3 weeks (around 4/13/2023) for Recheck.  Patient was given instructions and counseling regarding her condition or for health maintenance advice. Please see specific information pulled into the AVS if appropriate.     Mode of Visit: Video  Location of patient: home  Location of provider: [facility] [place] [facility]  You have chosen to receive care through a telehealth visit.  The patient has signed the video visit consent form.  The visit included audio and video interaction. No technical issues occurred during this visit.

## 2023-04-06 LAB — EXTERNAL HEPATITIS C AB: NONREACTIVE

## 2023-04-13 ENCOUNTER — LAB (OUTPATIENT)
Dept: LAB | Facility: HOSPITAL | Age: 23
End: 2023-04-13
Payer: COMMERCIAL

## 2023-05-09 ENCOUNTER — TRANSCRIBE ORDERS (OUTPATIENT)
Dept: ADMINISTRATIVE | Facility: HOSPITAL | Age: 23
End: 2023-05-09
Payer: COMMERCIAL

## 2023-05-09 DIAGNOSIS — Z36.9 UNSPECIFIED ANTENATAL SCREENING: Primary | ICD-10-CM

## 2023-05-15 ENCOUNTER — TELEPHONE (OUTPATIENT)
Dept: PSYCHIATRY | Facility: CLINIC | Age: 23
End: 2023-05-15
Payer: COMMERCIAL

## 2023-05-15 NOTE — TELEPHONE ENCOUNTER
PATIENT WAS REFERRED TO NEXT STEP ON 09/23/2022. SEVERAL ATTEMPTS MADE TO CONTACT PATIENT INCLUDING MAILING A CONTACT LETTER WITH NO SUCCESS. CLOSING OUT REFERRAL.

## 2023-05-25 ENCOUNTER — HOSPITAL ENCOUNTER (OUTPATIENT)
Dept: ULTRASOUND IMAGING | Facility: HOSPITAL | Age: 23
Discharge: HOME | End: 2023-05-25
Admitting: OBSTETRICS & GYNECOLOGY
Payer: COMMERCIAL

## 2023-05-25 DIAGNOSIS — Z36.9 UNSPECIFIED ANTENATAL SCREENING: ICD-10-CM

## 2023-05-25 PROCEDURE — 76812 OB US DETAILED ADDL FETUS: CPT

## 2023-08-30 DIAGNOSIS — F41.1 GAD (GENERALIZED ANXIETY DISORDER): ICD-10-CM

## 2023-08-30 RX ORDER — LORAZEPAM 0.5 MG/1
0.75 TABLET ORAL EVERY 8 HOURS PRN
Qty: 135 TABLET | Refills: 2 | Status: SHIPPED | OUTPATIENT
Start: 2023-08-30

## 2023-09-14 RX ORDER — METOPROLOL SUCCINATE 25 MG/1
TABLET, EXTENDED RELEASE ORAL
Qty: 30 TABLET | Refills: 3 | OUTPATIENT
Start: 2023-09-14

## 2023-09-18 RX ORDER — METOPROLOL SUCCINATE 25 MG/1
25 TABLET, EXTENDED RELEASE ORAL DAILY
Qty: 90 TABLET | Refills: 3 | Status: SHIPPED | OUTPATIENT
Start: 2023-09-18

## 2023-09-18 NOTE — TELEPHONE ENCOUNTER
Caller: Kristina Parson    Relationship: Self    Best call back number: [phone]     Requested Prescriptions:   Requested Prescriptions     Pending Prescriptions Disp Refills    metoprolol succinate XL (TOPROL-XL) 25 MG 24 hr tablet 90 tablet 3     Sig: Take 1 tablet by mouth Daily.        Pharmacy where request should be sent: [facility]. [place], [place] - [address] [facility] [phone]  - [phone] FX     Last office visit with prescribing clinician: 2/21/2023   Last telemedicine visit with prescribing clinician: 3/23/2023   Next office visit with prescribing clinician: 9/28/2023     Does the patient have less than a 3 day supply:  [x] Yes  [] No    Would you like a call back once the refill request has been completed: [] Yes [] No    If the office needs to give you a call back, can they leave a voicemail: [] Yes [] No    Parish Partida Rep   09/18/23 09:41 EDT

## 2023-09-26 ENCOUNTER — HOSPITAL ENCOUNTER (OUTPATIENT)
Facility: HOSPITAL | Age: 23
Discharge: HOME | End: 2023-09-26
Attending: OBSTETRICS & GYNECOLOGY | Admitting: OBSTETRICS & GYNECOLOGY
Payer: COMMERCIAL

## 2023-09-26 VITALS
HEART RATE: 72 BPM | HEIGHT: 62 IN | RESPIRATION RATE: 20 BRPM | DIASTOLIC BLOOD PRESSURE: 73 MMHG | OXYGEN SATURATION: 99 % | WEIGHT: 175 LBS | BODY MASS INDEX: 32.2 KG/M2 | SYSTOLIC BLOOD PRESSURE: 131 MMHG | TEMPERATURE: 98.3 F

## 2023-09-26 LAB
BILIRUB BLD-MCNC: NEGATIVE MG/DL
CLARITY, POC: CLEAR
COLOR UR: YELLOW
GLUCOSE UR STRIP-MCNC: NEGATIVE MG/DL
KETONES UR QL: NEGATIVE
LEUKOCYTE EST, POC: NEGATIVE
NITRITE UR-MCNC: NEGATIVE MG/ML
PH UR: 7 [PH] (ref 5–8)
PROT UR STRIP-MCNC: NEGATIVE MG/DL
RBC # UR STRIP: ABNORMAL /UL
SP GR UR: 1.01 (ref 1–1.03)
UROBILINOGEN UR QL: NORMAL

## 2023-09-26 PROCEDURE — 59025 FETAL NON-STRESS TEST: CPT

## 2023-09-26 PROCEDURE — G0463 HOSPITAL OUTPT CLINIC VISIT: HCPCS

## 2023-09-26 PROCEDURE — 81002 URINALYSIS NONAUTO W/O SCOPE: CPT | Performed by: OBSTETRICS & GYNECOLOGY

## 2023-09-27 NOTE — DISCHARGE INSTR - ACTIVITY
Keep scheduled appointment, return if s/s of PTL noted as instructed, perform kick counts as instructed.

## 2023-09-27 NOTE — NON STRESS TEST
"Obstetrical Non-stress Test Interpretation     Name:  Kristina Parson  MRN: [id number]    23 y.o. female  at 36w1d    Indication: decreased fetal movement      Fetal Assessment  Fetal Movement: active  Fetal HR Assessment Method: external  Fetal HR (beats/min): 130  Fetal HR Baseline: normal range  Fetal HR Variability: moderate (amplitude range 6 to 25 bpm)  Fetal HR Accelerations: episodic, greater than/equal to 15 bpm, lasting at least 15 seconds  Fetal HR Decelerations: absent  Sinusoidal Pattern Present: absent    /73   Pulse 72   Temp 98.3 °F (36.8 °C) (Oral)   Resp 20   Ht 157.5 cm (62\")   Wt 79.4 kg (175 lb)   SpO2 99%   BMI 32.01 kg/m²     Reason for test:  decreased fetal movement  Date of Test: 2023  Time frame of test: 5969-6019  RN NST Interpretation:  reactive for gestational age.      Britney Garrison RN  2023  20:56 EDT  "

## 2023-09-27 NOTE — NURSING NOTE
Pt.  States good fm now noted,  in to assess pt., order received to d/c home, pt  D/c home in stable condition, PTL precautions,kick count, home and follow up care instructions given, pt. Voiced understanding, pt. Amb. To car without distress noted, s/o at side.

## 2023-09-27 NOTE — NURSING NOTE
" 36.1 weeks presents with c/o decreased FM since 1300 today, pt. Denies ctx:, leakage of fluid, or vaginal bleeding, states she has only drank one to two bottles of water today, occasional ctx: noted on monitor with uterine irrit., abd. Soft, non-tender with palpation, pt. Denies any discomforts, [id number] audibles movements heard on monitor and palpated, pt. States \"I feel him moving now\", pt. Provided pitcher of water, and NST julio button, at nurses station, give above info., will come and assess. Pt.  "

## 2023-09-27 NOTE — H&P
CHUCKIE Whittaker  Obstetric History and Physical    Chief Complaint   Patient presents with    Decreased Fetal Movement       Subjective     HPI:    Patient is a 23 y.o. female  currently at 36w1d, who presents with decreased FM;  tried sugar, moving abdomen;  no ctx, LOF/vb.    Her prenatal care is benign.  Her previous obstetric/gynecological history is noted for is non-contributory.    The following portions of the patients history were reviewed and updated as appropriate:   current medications, allergies, past medical history, past surgical history, past family history, past social history and current problem list.     Prenatal Information:  Prenatal Results       Initial Prenatal Labs       Test Value Reference Range Date Time    Hemoglobin        Hematocrit        Platelets        Rubella IgG        Hepatitis B SAg        Hepatitis C Ab        RPR        T. Pallidum Ab         ABO        Rh        Antibody Screen        HIV        Urine Culture        Gonorrhea        Chlamydia        TSH  0.713 uIU/mL 0.270 - 4.200 22 1855    HgB A1c         Varicella IgG        HgB Electrophoresis         Cystic fibrosis                   Fetal testing        Test Value Reference Range Date Time    NIPT        MSAFP        AFP-4                  2nd and 3rd Trimester       Test Value Reference Range Date Time    Hemoglobin (repeated)  11.5 g/dL 12.0 - 15.9 23 1122    Hematocrit (repeated)  34.1 % 34.0 - 46.6 23 1122    Platelets   172 10*3/mm3 140 - 450 23 1122    GCT        Antibody Screen (repeated)        GTT Fasting        GTT 1 Hr        GTT 2 Hr        GTT 3 Hr        Group B Strep                  Other testing        Test Value Reference Range Date Time    Parvo IgG         CMV IgG                   Drug Screening       Test Value Reference Range Date Time    Amphetamine Screen        Barbiturate Screen        Benzodiazepine Screen        Methadone Screen        Phencyclidine Screen         Opiates Screen        THC Screen        Cocaine Screen        Propoxyphene Screen        Buprenorphine Screen        Methamphetamine Screen        Oxycodone Screen        Tricyclic Antidepressants Screen                  Legend    ^: Historical                          External Prenatal Results       Pregnancy Outside Results - Transcribed From Office Records - See Scanned Records For Details       Test Value Date Time    ABO       Rh       Antibody Screen       Varicella IgG       Rubella       Hgb  11.5 g/dL 23 1122    Hct  34.1 % 23 1122    Glucose Fasting GTT       Glucose Tolerance Test 1 hour       Glucose Tolerance Test 3 hour       Gonorrhea (discrete)       Chlamydia (discrete)       RPR       VDRL       Syphilis Antibody       HBsAg       Herpes Simplex Virus PCR       Herpes Simplex VIrus Culture       HIV       Hep C RNA Quant PCR       Hep C Antibody       AFP       Group B Strep       GBS Susceptibility to Clindamycin       GBS Susceptibility to Erythromycin       Fetal Fibronectin       Genetic Testing, Maternal Blood                 Drug Screening       Test Value Date Time    Urine Drug Screen       Amphetamine Screen       Barbiturate Screen  Negative  22    Benzodiazepine Screen  Negative  22    Methadone Screen  Negative  22    Phencyclidine Screen       Opiates Screen  Negative  22    THC Screen  Negative  22    Cocaine Screen       Propoxyphene Screen       Buprenorphine Screen       Methamphetamine Screen       Oxycodone Screen  Negative  22    Tricyclic Antidepressants Screen                 Legend    ^: Historical                             Past OB History:     OB History    Para Term  AB Living   1 0 0 0 0 0   SAB IAB Ectopic Molar Multiple Live Births   0 0 0 0 0 0      # Outcome Date GA Lbr Malik/2nd Weight Sex Delivery Anes PTL Lv   1 Current               Obstetric Comments   5 weeks and 2 days  per patient       Past Medical History: Past Medical History:   Diagnosis Date    Anxiety     Closed displaced fracture of fifth metatarsal bone 09/26/2018    Femur fracture     POLI (generalized anxiety disorder)     Hand injury     Right    Hand pain, right     Migraine     Nausea 07/11/2021    Otalgia     Otitis media       Past Surgical History Past Surgical History:   Procedure Laterality Date    FEMUR FRACTURE SURGERY  2014    WISDOM TOOTH EXTRACTION  2020      Family History: Family History   Problem Relation Age of Onset    No Known Problems Mother     No Known Problems Father     Stroke Paternal Grandmother     Diabetes Paternal Grandmother         Unspecified    Stroke Paternal Grandfather     Diabetes Paternal Grandfather         Unspecified      Social History:  reports that she has never smoked. She has never been exposed to tobacco smoke. She has never used smokeless tobacco.   reports no history of alcohol use.   reports no history of drug use.        General ROS: Pertinent items are noted in HPI  Home Medications:  LORazepam, PARoxetine, Prenatal/Folic Acid, docusate sodium, metoprolol succinate XL, ondansetron ODT, and promethazine    Allergies:  Allergies   Allergen Reactions    Wellbutrin [Bupropion] Palpitations       Objective       Vital Signs Range for the last 24 hours  Temperature: Temp:  [98.3 °F (36.8 °C)] 98.3 °F (36.8 °C)   Temp Source: Temp src: Oral   BP: BP: (137)/(80) 137/80   Pulse: Heart Rate:  [73] 73   Respirations: Resp:  [20] 20   SPO2: SpO2:  [99 %] 99 %     Physical Examination:   General appearance - alert, well appearing, and in no distress  Mental status - alert, oriented to person, place, and time  Abdomen - soft, nontender, nondistended,   Pelvic - normal external genitalia, vulva, vagina, cervix, and uterus   Back exam - full range of motion, no tenderness or pain on motion  Neurological - alert, oriented, normal speech  Extremities - peripheral pulses normal  Skin -  normal coloration and turgor, no suspicious skin lesions noted    Presentation: unknown   Cervix:     Dilation:     Effacement:     Station:         Fetal Heart Rate Assessment :  130s, R, good carlin, no decels, cat 1  Method:     Beats/min:     Baseline:     Variability:     Accels:     Decels:     Tracing Category:       Uterine Assessment :  irreg  Method:     Frequency (min):     Ctx Count in 10 min:     Duration:     Intensity:     [place] Units:       GBS is unknown     Assessment & Plan       * No active hospital problems. *        Assessment:  1.  Intrauterine pregnancy at 36w1d gestation with reactive fetal status.    2.  decreased fetal movement  3.  Obstetrical history significant for is non-contributory.  4.  GBS status: No results found for: STREPGPB    Plan:  1. Discharge to home.  As baby reactive and moving now  2.  Plan of care has been reviewed with patient and patient agrees.   3.  Risks, benefits of treatment plan have been discussed.  4.  All questions have been answered.        Electronically signed by Helen Ceballos MD, 09/26/23, 8:07 PM EDT.

## 2023-09-28 ENCOUNTER — OFFICE VISIT (OUTPATIENT)
Dept: FAMILY MEDICINE CLINIC | Facility: CLINIC | Age: 23
End: 2023-09-28
Payer: COMMERCIAL

## 2023-09-28 VITALS
OXYGEN SATURATION: 99 % | SYSTOLIC BLOOD PRESSURE: 118 MMHG | BODY MASS INDEX: 33.34 KG/M2 | HEART RATE: 99 BPM | HEIGHT: 62 IN | TEMPERATURE: 98.7 F | WEIGHT: 181.2 LBS | DIASTOLIC BLOOD PRESSURE: 70 MMHG | RESPIRATION RATE: 16 BRPM

## 2023-09-28 DIAGNOSIS — Z00.00 ANNUAL PHYSICAL EXAM: Primary | ICD-10-CM

## 2023-09-28 DIAGNOSIS — Z3A.01 LESS THAN 8 WEEKS GESTATION OF PREGNANCY: ICD-10-CM

## 2023-09-28 DIAGNOSIS — Z79.899 MEDICATION MANAGEMENT: ICD-10-CM

## 2023-09-28 DIAGNOSIS — F41.9 ANXIETY: ICD-10-CM

## 2023-09-28 RX ORDER — FERROUS SULFATE 325(65) MG
TABLET ORAL
COMMUNITY
Start: 2023-09-08

## 2023-09-28 RX ORDER — PRENATAL VIT/IRON FUM/FOLIC AC 27 MG-1 MG
1 TABLET ORAL DAILY
Qty: 90 EACH | Refills: 2 | Status: SHIPPED | OUTPATIENT
Start: 2023-09-28

## 2023-09-28 RX ORDER — METOPROLOL SUCCINATE 25 MG/1
25 TABLET, EXTENDED RELEASE ORAL DAILY
Qty: 90 TABLET | Refills: 3 | Status: SHIPPED | OUTPATIENT
Start: 2023-09-28

## 2023-09-28 RX ORDER — PAROXETINE 10 MG/1
10 TABLET, FILM COATED ORAL EVERY MORNING
Qty: 90 TABLET | Refills: 1 | Status: SHIPPED | OUTPATIENT
Start: 2023-09-28

## 2023-09-28 NOTE — PROGRESS NOTES
"Chief Complaint  Annual Exam, Med Refill, and Anxiety    Subjective        Kristina Parson presents to [facility]  History of Present Illness    To follow-up on chronic conditions annual exam additionally patient needs refills on medications otherwise doing well no issues    Objective   Vital Signs:  /70   Pulse 99   Temp 98.7 °F (37.1 °C)   Resp 16   Ht 157.5 cm (62\")   Wt 82.2 kg (181 lb 3.2 oz)   SpO2 99%   BMI 33.14 kg/m²   Estimated body mass index is 33.14 kg/m² as calculated from the following:    Height as of this encounter: 157.5 cm (62\").    Weight as of this encounter: 82.2 kg (181 lb 3.2 oz).               Physical Exam  Vitals reviewed.   Constitutional:       Appearance: Normal appearance. She is well-developed.   HENT:      Head: Normocephalic and atraumatic.      Right Ear: External ear normal.      Left Ear: External ear normal.      Nose: Nose normal.   Eyes:      Conjunctiva/sclera: Conjunctivae normal.      Pupils: Pupils are equal, round, and reactive to light.   Cardiovascular:      Rate and Rhythm: Normal rate.   Pulmonary:      Effort: Pulmonary effort is normal.      Breath sounds: Normal breath sounds.   Abdominal:      General: There is no distension.   Skin:     General: Skin is warm and dry.   Neurological:      Mental Status: She is alert and oriented to person, place, and time. Mental status is at baseline.   Psychiatric:         Mood and Affect: Mood and affect normal.         Behavior: Behavior normal.         Thought Content: Thought content normal.         Judgment: Judgment normal.      Result Review :  The following data was reviewed by: Nils Myers DO on 09/28/2023:  Common labs          7/12/2023    11:22   Common Labs   Glucose 92    BUN 5    Creatinine 0.57    Sodium 136    Potassium 3.7    Chloride 103    Calcium 9.2    Albumin 3.5    Total Bilirubin <0.2    Alkaline Phosphatase 69    AST (SGOT) 17    ALT (SGPT) 12    WBC 12.43  "   Hemoglobin 11.5    Hematocrit 34.1    Platelets 172                   Assessment and Plan   Diagnoses and all orders for this visit:    1. Annual physical exam (Primary)    2. Medication management    3. Anxiety    4. Less than 8 weeks gestation of pregnancy  -     Prenatal Vit-Fe Fumarate-FA (Prenatal/Folic Acid) tablet; Take 1 tablet by mouth Daily.  Dispense: 90 each; Refill: 2    Other orders  -     metoprolol succinate XL (TOPROL-XL) 25 MG 24 hr tablet; Take 1 tablet by mouth Daily.  Dispense: 90 tablet; Refill: 3  -     PARoxetine (PAXIL) 10 MG tablet; Take 1 tablet by mouth Every Morning.  Dispense: 90 tablet; Refill: 1        Reviewed health status and screened for age appropriate conditions, will order labs today to manage, screen and follow up at least yearly    Recommended and reviewed age appropriate immunizations cancer screenings today     Recommend wearing sunscreen and following up on any concerning skin conditions or lesions, wearing seat belts and other risk reduction measures to lessen incident of death, disease or other     Counseled on risks, disease and advice given on screening and continued management of health and condition through the next year until next physical          Follow Up   Return if symptoms worsen or fail to improve, for Recheck.  Patient was given instructions and counseling regarding her condition or for health maintenance advice. Please see specific information pulled into the AVS if appropriate.

## 2023-10-15 ENCOUNTER — HOSPITAL ENCOUNTER (OUTPATIENT)
Facility: HOSPITAL | Age: 23
Discharge: HOME | End: 2023-10-15
Attending: OBSTETRICS & GYNECOLOGY | Admitting: OBSTETRICS & GYNECOLOGY
Payer: COMMERCIAL

## 2023-10-15 VITALS
DIASTOLIC BLOOD PRESSURE: 73 MMHG | HEART RATE: 83 BPM | OXYGEN SATURATION: 99 % | RESPIRATION RATE: 18 BRPM | TEMPERATURE: 98.2 F | SYSTOLIC BLOOD PRESSURE: 132 MMHG

## 2023-10-15 PROCEDURE — 59025 FETAL NON-STRESS TEST: CPT

## 2023-10-15 PROCEDURE — G0463 HOSPITAL OUTPT CLINIC VISIT: HCPCS

## 2023-10-16 NOTE — NURSING NOTE
SVE remains unchanged. Patient reports contractions and pain has decreases since arrival. Results given to Dr Banegas. Orders given to D/C home with labor precautions.

## 2023-10-16 NOTE — NURSING NOTE
G1 38.6 weeks gestation presents with complaints of contractions since 1830. Patient states +FM, and denies LOF, VB, and ABD tenderness. SVE 1-2/75/-3. Contacted Dr collier via telephone to review FM. States he will be down to evaluate patient.

## 2023-10-16 NOTE — OBED NOTES
OBGYN Consult Note    Patient Name: Kristina Parson           : 2000        MRN: [id number]  Primary Care Physician:  Nils Myers DO    Referring Physician: Rah Banegas MD  Date of admission: 10/15/2023      Subjective     Reason for Consult: Blaire is a 23-year-old G1, P0 who presents at 38 weeks and 6 days for a labor assessment.    HPI:  23 y.o.  38w6d presenting with complaints of some suprapubic pain as well as some back pain which she perceives to be slightly more on the lower left side than on the right.  She had felt some rhythmic contraction type pain which occurred approximately every 7 minutes for a couple of hours prior to her presentation however admits that these have abated somewhat since her arrival here.  Her characterization of the contractions is somewhat vague and is not typical of an active labor type pattern.  She reports good fetal movement.  She has no vaginal bleeding or loss of fluid.  She claims that her pregnancy has been otherwise uncomplicated.  She denies problems with blood pressure or glycemic control.  And it is of note that she is scheduled for an elective induction on Tuesday,  or approximately 30 hours hence.  Has otherwise been well has no recent febrile illness respiratory infection or urinary tract symptoms.    Review of Systems: No leaking fluid, No vaginal bleeding, Is feeling adequate FM, No HA, No RUQ/epigastric pain, No swelling, and No fever/chills      Personal History     Past OB History: She is a primigravida and has no prior obstetrical history    Prenatal Labs:    External Prenatal Results       Pregnancy Outside Results - Transcribed From Office Records - See Scanned Records For Details       Test Value Date Time    ABO       Rh       Antibody Screen       Varicella IgG       Rubella       Hgb  11.5 g/dL 23 1122    Hct  34.1 % 23 1122    Glucose Fasting GTT       Glucose Tolerance Test 1 hour       Glucose Tolerance Test 3 hour        Gonorrhea (discrete)       Chlamydia (discrete)       RPR       VDRL       Syphilis Antibody       HBsAg       Herpes Simplex Virus PCR       Herpes Simplex VIrus Culture       HIV       Hep C RNA Quant PCR       Hep C Antibody       AFP       Group B Strep       GBS Susceptibility to Clindamycin       GBS Susceptibility to Erythromycin       Fetal Fibronectin       Genetic Testing, Maternal Blood                 Drug Screening       Test Value Date Time    Urine Drug Screen       Amphetamine Screen       Barbiturate Screen  Negative  07/18/22 2057    Benzodiazepine Screen  Negative  07/18/22 2057    Methadone Screen  Negative  07/18/22 2057    Phencyclidine Screen       Opiates Screen  Negative  07/18/22 2057    THC Screen  Negative  07/18/22 2057    Cocaine Screen       Propoxyphene Screen       Buprenorphine Screen       Methamphetamine Screen       Oxycodone Screen  Negative  07/18/22 2057    Tricyclic Antidepressants Screen                 Legend    ^: Historical                            PMHx:    Past Medical History:   Diagnosis Date    Anxiety     Closed displaced fracture of fifth metatarsal bone 09/26/2018    Femur fracture     POLI (generalized anxiety disorder)     Hand injury     Right    Hand pain, right     Migraine     Nausea 07/11/2021    Otalgia     Otitis media        Home Medications:  PARoxetine, Prenatal/Folic Acid, ferrous sulfate, and metoprolol succinate XL    Allergies:  Allergies   Allergen Reactions    Wellbutrin [Bupropion] Palpitations       PSHx:   Past Surgical History:   Procedure Laterality Date    FEMUR FRACTURE SURGERY  2014    WISDOM TOOTH EXTRACTION  2020       Social History:    reports that she has never smoked. She has never been exposed to tobacco smoke. She has never used smokeless tobacco. She reports that she does not drink alcohol and does not use drugs.    Family History: Non contributory    Immunizations: See prenatal record for Tdap, Flu, Covid and/or other  vaccinations      Objective     Vitals: reviewed    Physical Exam   General- NAD, alert and oriented, appropriate, she does not appear uncomfortable  Psych- Normal mood, good memory              Abdomen- NABS, soft, non distended, non tender, no masses  Abdomen- Gravid, non tender  Fundus-  Size: consistent w dates.  Non tender.  Cvx- 1-2cm / 50% / -2  Presentation- VTX  Ext/DTRs- Trace edema, DTR patella +1  Fetal HR: Category I, when originally placed on the external monitor she did have a couple of variable decelerations of the fetal heart rate however this pattern resolved and she has approximately 30 to 40 minutes of reassuring category 1 reactive fetal heart rate tracing.  Contractions: Irritability      Brief Urine Lab Results  (Last result in the past 365 days)        Color   Clarity   Blood   Leuk Est   Nitrite   Protein   CREAT   Urine HCG        23 Yellow   Clear   Trace   Negative   Negative   Negative                   [unfilled]    [unfilled]    CBC          2023    11:22   CBC   WBC 12.43    RBC 3.73    Hemoglobin 11.5    Hematocrit 34.1    MCV 91.4    MCH 30.8    MCHC 33.7    RDW 13.5    Platelets 172        CMP          2023    11:22   CMP   Glucose 92    BUN 5    Creatinine 0.57    EGFR 132.8    Sodium 136    Potassium 3.7    Chloride 103    Calcium 9.2    Total Protein 6.3    Albumin 3.5    Globulin 2.8    Total Bilirubin <0.2    Alkaline Phosphatase 69    AST (SGOT) 17    ALT (SGPT) 12    Albumin/Globulin Ratio 1.3    BUN/Creatinine Ratio 8.8    Anion Gap 9.9          Assessment / Plan       Assessment:   1 para 0 at 38 weeks 6 days presenting for labor assessment but does not appear to be in labor fetal status is reassuring.    Plan:   Repeat cervical exam after approximately 1 hour and if no change patient will be discharged home with all appropriate warnings precautions and admonitions.  She is scheduled for elective induction on Tuesday of this week however  will return sooner if active labor ensues.      Electronically signed by Rah Banegas MD, 10/15/23, 9:37 PM EDT.

## 2023-10-16 NOTE — NON STRESS TEST
Obstetrical Non-stress Test Interpretation     Name:  Kristina Parson  MRN: [id number]    23 y.o. female  at 38w6d    Indication: contractions      Fetal Assessment  Fetal Movement: active  Fetal HR Assessment Method: external  Fetal HR (beats/min): 120  Fetal HR Baseline: normal range  Fetal HR Variability: moderate (amplitude range 6 to 25 bpm)  Fetal HR Accelerations: lasting at least 15 seconds, greater than/equal to 15 bpm  Fetal HR Decelerations: variable  Sinusoidal Pattern Present: absent    /73 (BP Location: Right arm, Patient Position: Lying)   Pulse 83   Temp 98.2 °F (36.8 °C) (Oral)   Resp 18   SpO2 99%     Reason for test:  contractions  Date of Test: 10/15/2023  Time frame of test: 5264-7348  RN NST Interpretation:  reactive      Vaishnavi Valdivia RN  10/15/2023  22:08 EDT

## 2023-10-16 NOTE — NURSING NOTE
Dr Banegas at bedside to evaluate patient. Orders given to recheck SVE after one hour then call with results.

## 2023-10-16 NOTE — NURSING NOTE
At bedside with patient. Education and written materials provided. Discussed reasons to return. Patient agrees and no questions at this time. Patient left ambulatory no distress noted.

## 2023-10-17 ENCOUNTER — ANESTHESIA EVENT (OUTPATIENT)
Dept: LABOR AND DELIVERY | Facility: HOSPITAL | Age: 23
End: 2023-10-17
Payer: COMMERCIAL

## 2023-10-17 ENCOUNTER — HOSPITAL ENCOUNTER (OUTPATIENT)
Dept: LABOR AND DELIVERY | Facility: HOSPITAL | Age: 23
Discharge: HOME | End: 2023-10-17
Payer: COMMERCIAL

## 2023-10-17 ENCOUNTER — ANESTHESIA (OUTPATIENT)
Dept: LABOR AND DELIVERY | Facility: HOSPITAL | Age: 23
End: 2023-10-17
Payer: COMMERCIAL

## 2023-10-17 ENCOUNTER — HOSPITAL ENCOUNTER (INPATIENT)
Facility: HOSPITAL | Age: 23
LOS: 2 days | Discharge: HOME | End: 2023-10-19
Attending: OBSTETRICS & GYNECOLOGY | Admitting: OBSTETRICS & GYNECOLOGY
Payer: COMMERCIAL

## 2023-10-17 PROBLEM — Z3A.39 PREGNANCY WITH 39 COMPLETED WEEKS GESTATION: Status: ACTIVE | Noted: 2023-10-17

## 2023-10-17 PROBLEM — Z3A.39 PREGNANCY WITH 39 COMPLETED WEEKS GESTATION: Status: RESOLVED | Noted: 2023-10-17 | Resolved: 2023-10-17

## 2023-10-17 PROBLEM — Z3A.01 LESS THAN 8 WEEKS GESTATION OF PREGNANCY: Status: RESOLVED | Noted: 2023-02-21 | Resolved: 2023-10-17

## 2023-10-17 LAB
ABO GROUP BLD: NORMAL
ABO GROUP BLD: NORMAL
BASE EXCESS BLDCOA CALC-SCNC: -3.7 MMOL/L (ref -2–2)
BASE EXCESS BLDCOV CALC-SCNC: -2.7 MMOL/L (ref -2–2)
BLD GP AB SCN SERPL QL: NEGATIVE
DEPRECATED RDW RBC AUTO: 42.5 FL (ref 37–54)
ERYTHROCYTE [DISTWIDTH] IN BLOOD BY AUTOMATED COUNT: 13 % (ref 12.3–15.4)
HCO3 BLDCOA-SCNC: 22.5 MMOL/L
HCO3 BLDCOV-SCNC: 23.9 MMOL/L
HCT VFR BLD AUTO: 40.1 % (ref 34–46.6)
HGB BLD-MCNC: 13.1 G/DL (ref 12–15.9)
MCH RBC QN AUTO: 29.6 PG (ref 26.6–33)
MCHC RBC AUTO-ENTMCNC: 32.7 G/DL (ref 31.5–35.7)
MCV RBC AUTO: 90.5 FL (ref 79–97)
PCO2 BLDCOA: 44.5 MMHG (ref 33–49)
PCO2 BLDCOV: 48.2 MM HG (ref 28–40)
PH BLDCOA: 7.32 PH UNITS (ref 7.21–7.31)
PH BLDCOV: 7.31 PH UNITS (ref 7.31–7.37)
PLATELET # BLD AUTO: 162 10*3/MM3 (ref 140–450)
PMV BLD AUTO: 10.7 FL (ref 6–12)
PO2 BLDCOA: <40.5 MMHG
PO2 BLDCOV: <40.5 MM HG (ref 21–31)
RBC # BLD AUTO: 4.43 10*6/MM3 (ref 3.77–5.28)
RH BLD: POSITIVE
RH BLD: POSITIVE
T&S EXPIRATION DATE: NORMAL
WBC NRBC COR # BLD: 13.87 10*3/MM3 (ref 3.4–10.8)

## 2023-10-17 PROCEDURE — 3E033VJ INTRODUCTION OF OTHER HORMONE INTO PERIPHERAL VEIN, PERCUTANEOUS APPROACH: ICD-10-PCS | Performed by: OBSTETRICS & GYNECOLOGY

## 2023-10-17 PROCEDURE — 25010000002 ROPIVACAINE PER 1 MG: Performed by: ANESTHESIOLOGY

## 2023-10-17 PROCEDURE — 25010000002 FENTANYL CITRATE (PF) 50 MCG/ML SOLUTION: Performed by: ANESTHESIOLOGY

## 2023-10-17 PROCEDURE — 86901 BLOOD TYPING SEROLOGIC RH(D): CPT | Performed by: OBSTETRICS & GYNECOLOGY

## 2023-10-17 PROCEDURE — 86850 RBC ANTIBODY SCREEN: CPT | Performed by: OBSTETRICS & GYNECOLOGY

## 2023-10-17 PROCEDURE — 25810000003 LACTATED RINGERS SOLUTION: Performed by: OBSTETRICS & GYNECOLOGY

## 2023-10-17 PROCEDURE — 59025 FETAL NON-STRESS TEST: CPT

## 2023-10-17 PROCEDURE — 25010000002 TERBUTALINE PER 1 MG

## 2023-10-17 PROCEDURE — 25810000003 LACTATED RINGERS PER 1000 ML: Performed by: OBSTETRICS & GYNECOLOGY

## 2023-10-17 PROCEDURE — 51702 INSERT TEMP BLADDER CATH: CPT

## 2023-10-17 PROCEDURE — 86900 BLOOD TYPING SEROLOGIC ABO: CPT

## 2023-10-17 PROCEDURE — C1755 CATHETER, INTRASPINAL: HCPCS | Performed by: ANESTHESIOLOGY

## 2023-10-17 PROCEDURE — 82803 BLOOD GASES ANY COMBINATION: CPT | Performed by: OBSTETRICS & GYNECOLOGY

## 2023-10-17 PROCEDURE — 86901 BLOOD TYPING SEROLOGIC RH(D): CPT

## 2023-10-17 PROCEDURE — 85027 COMPLETE CBC AUTOMATED: CPT | Performed by: OBSTETRICS & GYNECOLOGY

## 2023-10-17 PROCEDURE — 10907ZC DRAINAGE OF AMNIOTIC FLUID, THERAPEUTIC FROM PRODUCTS OF CONCEPTION, VIA NATURAL OR ARTIFICIAL OPENING: ICD-10-PCS | Performed by: OBSTETRICS & GYNECOLOGY

## 2023-10-17 PROCEDURE — 86900 BLOOD TYPING SEROLOGIC ABO: CPT | Performed by: OBSTETRICS & GYNECOLOGY

## 2023-10-17 RX ORDER — MORPHINE SULFATE 2 MG/ML
1 INJECTION, SOLUTION INTRAMUSCULAR; INTRAVENOUS EVERY 4 HOURS PRN
Status: DISCONTINUED | OUTPATIENT
Start: 2023-10-17 | End: 2023-10-18 | Stop reason: HOSPADM

## 2023-10-17 RX ORDER — NALOXONE HCL 0.4 MG/ML
0.4 VIAL (ML) INJECTION
Status: DISCONTINUED | OUTPATIENT
Start: 2023-10-17 | End: 2023-10-18 | Stop reason: HOSPADM

## 2023-10-17 RX ORDER — TERBUTALINE SULFATE 1 MG/ML
INJECTION, SOLUTION SUBCUTANEOUS
Status: COMPLETED
Start: 2023-10-17 | End: 2023-10-17

## 2023-10-17 RX ORDER — LIDOCAINE HYDROCHLORIDE AND EPINEPHRINE 15; 5 MG/ML; UG/ML
INJECTION, SOLUTION EPIDURAL
Status: COMPLETED | OUTPATIENT
Start: 2023-10-17 | End: 2023-10-17

## 2023-10-17 RX ORDER — ROPIVACAINE HYDROCHLORIDE 2 MG/ML
INJECTION, SOLUTION EPIDURAL; INFILTRATION; PERINEURAL
Status: COMPLETED | OUTPATIENT
Start: 2023-10-17 | End: 2023-10-17

## 2023-10-17 RX ORDER — IBUPROFEN 600 MG/1
600 TABLET ORAL EVERY 6 HOURS SCHEDULED
Status: DISCONTINUED | OUTPATIENT
Start: 2023-10-18 | End: 2023-10-18 | Stop reason: HOSPADM

## 2023-10-17 RX ORDER — ONDANSETRON 2 MG/ML
4 INJECTION INTRAMUSCULAR; INTRAVENOUS EVERY 6 HOURS PRN
Status: DISCONTINUED | OUTPATIENT
Start: 2023-10-17 | End: 2023-10-18 | Stop reason: HOSPADM

## 2023-10-17 RX ORDER — OXYTOCIN/0.9 % SODIUM CHLORIDE 30/500 ML
2-20 PLASTIC BAG, INJECTION (ML) INTRAVENOUS
Status: DISCONTINUED | OUTPATIENT
Start: 2023-10-17 | End: 2023-10-18 | Stop reason: HOSPADM

## 2023-10-17 RX ORDER — SODIUM CHLORIDE 0.9 % (FLUSH) 0.9 %
10 SYRINGE (ML) INJECTION EVERY 12 HOURS SCHEDULED
Status: DISCONTINUED | OUTPATIENT
Start: 2023-10-17 | End: 2023-10-18 | Stop reason: HOSPADM

## 2023-10-17 RX ORDER — SODIUM CHLORIDE 0.9 % (FLUSH) 0.9 %
10 SYRINGE (ML) INJECTION AS NEEDED
Status: DISCONTINUED | OUTPATIENT
Start: 2023-10-17 | End: 2023-10-18 | Stop reason: HOSPADM

## 2023-10-17 RX ORDER — ROPIVACAINE HYDROCHLORIDE 2 MG/ML
INJECTION, SOLUTION EPIDURAL; INFILTRATION; PERINEURAL
Status: COMPLETED
Start: 2023-10-17 | End: 2023-10-17

## 2023-10-17 RX ORDER — FENTANYL CITRATE 50 UG/ML
INJECTION, SOLUTION INTRAMUSCULAR; INTRAVENOUS
Status: COMPLETED
Start: 2023-10-17 | End: 2023-10-17

## 2023-10-17 RX ORDER — CITRIC ACID/SODIUM CITRATE 334-500MG
30 SOLUTION, ORAL ORAL ONCE AS NEEDED
Status: DISCONTINUED | OUTPATIENT
Start: 2023-10-17 | End: 2023-10-18 | Stop reason: HOSPADM

## 2023-10-17 RX ORDER — MORPHINE SULFATE 2 MG/ML
2 INJECTION, SOLUTION INTRAMUSCULAR; INTRAVENOUS EVERY 4 HOURS PRN
Status: DISCONTINUED | OUTPATIENT
Start: 2023-10-17 | End: 2023-10-18 | Stop reason: HOSPADM

## 2023-10-17 RX ORDER — MISOPROSTOL 100 MCG
25 TABLET ORAL ONCE
Status: COMPLETED | OUTPATIENT
Start: 2023-10-17 | End: 2023-10-17

## 2023-10-17 RX ORDER — EPHEDRINE SULFATE 50 MG/ML
INJECTION INTRAVENOUS
Status: COMPLETED
Start: 2023-10-17 | End: 2023-10-17

## 2023-10-17 RX ORDER — SODIUM CHLORIDE, SODIUM LACTATE, POTASSIUM CHLORIDE, CALCIUM CHLORIDE 600; 310; 30; 20 MG/100ML; MG/100ML; MG/100ML; MG/100ML
125 INJECTION, SOLUTION INTRAVENOUS CONTINUOUS
Status: DISCONTINUED | OUTPATIENT
Start: 2023-10-17 | End: 2023-10-18

## 2023-10-17 RX ORDER — MISOPROSTOL 200 UG/1
800 TABLET ORAL AS NEEDED
Status: DISCONTINUED | OUTPATIENT
Start: 2023-10-17 | End: 2023-10-18 | Stop reason: HOSPADM

## 2023-10-17 RX ORDER — OXYTOCIN/0.9 % SODIUM CHLORIDE 30/500 ML
999 PLASTIC BAG, INJECTION (ML) INTRAVENOUS ONCE
Status: COMPLETED | OUTPATIENT
Start: 2023-10-17 | End: 2023-10-17

## 2023-10-17 RX ORDER — FENTANYL CITRATE 50 UG/ML
INJECTION, SOLUTION INTRAMUSCULAR; INTRAVENOUS
Status: COMPLETED | OUTPATIENT
Start: 2023-10-17 | End: 2023-10-17

## 2023-10-17 RX ORDER — METOPROLOL SUCCINATE 25 MG/1
25 TABLET, EXTENDED RELEASE ORAL DAILY
Status: DISCONTINUED | OUTPATIENT
Start: 2023-10-17 | End: 2023-10-19 | Stop reason: HOSPADM

## 2023-10-17 RX ORDER — SODIUM CHLORIDE 9 MG/ML
40 INJECTION, SOLUTION INTRAVENOUS AS NEEDED
Status: DISCONTINUED | OUTPATIENT
Start: 2023-10-17 | End: 2023-10-18 | Stop reason: HOSPADM

## 2023-10-17 RX ORDER — ONDANSETRON 4 MG/1
4 TABLET, FILM COATED ORAL EVERY 6 HOURS PRN
Status: DISCONTINUED | OUTPATIENT
Start: 2023-10-17 | End: 2023-10-18 | Stop reason: HOSPADM

## 2023-10-17 RX ORDER — EPHEDRINE SULFATE 50 MG/ML
5 INJECTION, SOLUTION INTRAVENOUS
Status: DISCONTINUED | OUTPATIENT
Start: 2023-10-17 | End: 2023-10-18 | Stop reason: HOSPADM

## 2023-10-17 RX ORDER — PAROXETINE 10 MG/1
10 TABLET, FILM COATED ORAL EVERY MORNING
Status: DISCONTINUED | OUTPATIENT
Start: 2023-10-17 | End: 2023-10-19 | Stop reason: HOSPADM

## 2023-10-17 RX ORDER — LIDOCAINE HYDROCHLORIDE 10 MG/ML
INJECTION, SOLUTION EPIDURAL; INFILTRATION; INTRACAUDAL; PERINEURAL
Status: COMPLETED
Start: 2023-10-17 | End: 2023-10-17

## 2023-10-17 RX ORDER — OXYTOCIN/0.9 % SODIUM CHLORIDE 30/500 ML
250 PLASTIC BAG, INJECTION (ML) INTRAVENOUS CONTINUOUS
Status: ACTIVE | OUTPATIENT
Start: 2023-10-17 | End: 2023-10-17

## 2023-10-17 RX ORDER — FAMOTIDINE 20 MG/1
20 TABLET, FILM COATED ORAL EVERY 12 HOURS SCHEDULED
Status: DISCONTINUED | OUTPATIENT
Start: 2023-10-17 | End: 2023-10-18 | Stop reason: HOSPADM

## 2023-10-17 RX ORDER — LIDOCAINE HYDROCHLORIDE 10 MG/ML
0.5 INJECTION, SOLUTION INFILTRATION; PERINEURAL ONCE AS NEEDED
Status: COMPLETED | OUTPATIENT
Start: 2023-10-17 | End: 2023-10-17

## 2023-10-17 RX ORDER — ACETAMINOPHEN 325 MG/1
650 TABLET ORAL EVERY 4 HOURS PRN
Status: DISCONTINUED | OUTPATIENT
Start: 2023-10-17 | End: 2023-10-18 | Stop reason: HOSPADM

## 2023-10-17 RX ORDER — FAMOTIDINE 10 MG/ML
20 INJECTION, SOLUTION INTRAVENOUS EVERY 12 HOURS SCHEDULED
Status: DISCONTINUED | OUTPATIENT
Start: 2023-10-17 | End: 2023-10-18 | Stop reason: HOSPADM

## 2023-10-17 RX ADMIN — Medication 10 ML/HR: at 18:45

## 2023-10-17 RX ADMIN — Medication: at 23:46

## 2023-10-17 RX ADMIN — FENTANYL CITRATE 100 MCG: 50 INJECTION, SOLUTION INTRAMUSCULAR; INTRAVENOUS at 11:43

## 2023-10-17 RX ADMIN — SODIUM CHLORIDE, POTASSIUM CHLORIDE, SODIUM LACTATE AND CALCIUM CHLORIDE 125 ML/HR: 600; 310; 30; 20 INJECTION, SOLUTION INTRAVENOUS at 08:00

## 2023-10-17 RX ADMIN — IBUPROFEN 600 MG: 600 TABLET, FILM COATED ORAL at 23:46

## 2023-10-17 RX ADMIN — Medication 999 ML/HR: at 21:13

## 2023-10-17 RX ADMIN — Medication 25 MCG: at 08:13

## 2023-10-17 RX ADMIN — Medication 1 MILLI-UNITS/MIN: at 15:58

## 2023-10-17 RX ADMIN — EPHEDRINE SULFATE 5 MG: 50 INJECTION, SOLUTION INTRAVENOUS at 12:00

## 2023-10-17 RX ADMIN — SODIUM CHLORIDE, POTASSIUM CHLORIDE, SODIUM LACTATE AND CALCIUM CHLORIDE 1000 ML: 600; 310; 30; 20 INJECTION, SOLUTION INTRAVENOUS at 10:45

## 2023-10-17 RX ADMIN — LIDOCAINE HYDROCHLORIDE 0.5 ML: 10 INJECTION, SOLUTION INFILTRATION; PERINEURAL at 21:10

## 2023-10-17 RX ADMIN — ROPIVACAINE HYDROCHLORIDE 5 ML: 2 INJECTION, SOLUTION EPIDURAL; INFILTRATION; PERINEURAL at 11:43

## 2023-10-17 RX ADMIN — SODIUM CHLORIDE, POTASSIUM CHLORIDE, SODIUM LACTATE AND CALCIUM CHLORIDE 125 ML/HR: 600; 310; 30; 20 INJECTION, SOLUTION INTRAVENOUS at 18:28

## 2023-10-17 RX ADMIN — Medication 10 ML/HR: at 11:44

## 2023-10-17 RX ADMIN — WITCH HAZEL: 500 SOLUTION RECTAL; TOPICAL at 23:46

## 2023-10-17 RX ADMIN — LIDOCAINE HYDROCHLORIDE AND EPINEPHRINE 3 ML: 15; 5 INJECTION, SOLUTION EPIDURAL at 11:41

## 2023-10-17 RX ADMIN — EPHEDRINE SULFATE 5 MG: 50 INJECTION INTRAVENOUS at 12:00

## 2023-10-17 RX ADMIN — Medication 250 ML/HR: at 21:35

## 2023-10-17 RX ADMIN — TERBUTALINE SULFATE 1 MG: 1 INJECTION SUBCUTANEOUS at 12:03

## 2023-10-17 RX ADMIN — LIDOCAINE HYDROCHLORIDE 0.5 ML: 10 INJECTION, SOLUTION EPIDURAL; INFILTRATION; INTRACAUDAL; PERINEURAL at 21:10

## 2023-10-17 NOTE — NURSING NOTE
Pt co increased pain on rt sided at pelvic bone.  SVE per RN.  Decel down to 80's-90's 4917-7097 after Vag exam.  Pit off placed in hands and knees and T-penny, fluid bolus given and Dr Ceballos called to room.  Dr Comer notified but is scrubbed in for OR.  FHR recovered to 100's after interventions.  Placed on rt side with peanut ball.

## 2023-10-17 NOTE — ANESTHESIA PROCEDURE NOTES
Labor Epidural      Patient reassessed immediately prior to procedure    Patient location during procedure: OB  Start Time: 10/17/2023 11:33 AM  Stop Time: 10/17/2023 11:33 AM  Performed By  Anesthesiologist: Vinay Ariza MD  Preanesthetic Checklist  Completed: patient identified, IV checked, site marked, risks and benefits discussed, surgical consent, monitors and equipment checked, pre-op evaluation and timeout performed  Prep:  Pt Position:sitting  Sterile Tech:cap, gloves, gown, mask and sterile barrier  Prep:povidone-iodine 7.5% surgical scrub  Monitoring:blood pressure monitoring and continuous pulse oximetry  Epidural Block Procedure:  Approach:midline  Guidance:landmark technique  Location:L3-L4  Needle Type:Tuohy  Needle Gauge:17 G  Loss of Resistance Medium: air  Paresthesia: none  Aspiration:negative  Test Dose:negative  Medication: lidocaine 1.5%-EPINEPHrine 1:200,000 (XYLOCAINE W/EPI) injection - Epidural   3 mL - 10/17/2023 11:41:00 AM  ropivacaine (NAROPIN) 0.2 % injection - Injection   5 mL - 10/17/2023 11:43:00 AM  fentaNYL citrate (PF) (SUBLIMAZE) injection - Epidural   100 mcg - 10/17/2023 11:43:00 AM  Number of Attempts: 1  Post Assessment:  Dressing:occlusive dressing applied and secured with tape  Pt Tolerance:patient tolerated the procedure well with no apparent complications  Complications:no

## 2023-10-17 NOTE — PROGRESS NOTES
" [facility]  Obstetric Progress Note    Subjective     Patient:    The patient feels well.      Objective     Vital Signs Range for the last 24 hours          BP: (123-124)/(71-74) 124/71   Heart Rate:  [67-71] 67   Resp:  [16] 16               Weight:  [79.4 kg (175 lb)] 79.4 kg (175 lb)       Flowsheet Rows      Flowsheet Row First Filed Value   Admission Height 157.5 cm (62\") Documented at 10/17/2023 0610   Admission Weight 79.4 kg (175 lb) Documented at 10/17/2023 0610            Intake/Output last 24 hours:    No intake or output data in the 24 hours ending 10/17/23 1104    Intake/Output this shift:    No intake/output data recorded.    Physical Exam:  General: Patient is in no acute distress   Heart CVS exam: normal rate, regular rhythm, normal S1, S2, no murmurs, rubs, clicks or gallops.   Lungs Chest: clear to auscultation, no wheezes, rales or rhonchi, symmetric air entry.     Abdomen Abdominal exam: soft, nontender, nondistended, no masses or organomegaly.   Extremities Exam of extremities: peripheral pulses normal, no pedal edema, no clubbing or cyanosis     Presentation: Vertex   Cervix: Exam by: Method: sterile exam per physician   Dilation: Cervical Dilation (cm): 4   Effacement: Cervical Effacement: 50%   Station:           Fetal Heart Rate Assessment   Method: Fetal HR Assessment Method: external   Beats/min: Fetal HR (beats/min): 125   Baseline: Fetal HR Baseline: normal range   Variability: Fetal HR Variability: moderate (amplitude range 6 to 25 bpm)   Accels: Fetal HR Accelerations: greater than/equal to 15 bpm, lasting at least 15 seconds   Decels: Fetal HR Decelerations: absent   Tracing Category: Fetal HR Tracing Category: Category I     Uterine Assessment   Method: Method: palpation, per patient report, external tocotransducer   Frequency (min):     Ctx Count in 10 min:     Duration:     Intensity: Contraction Intensity: mild by palpation   Intensity by IUPC:     Resting Tone: Uterine Resting " Tone: soft by palpation   Resting Tone by IUPC:     Caroline Units:         Assessment & Plan       Pregnancy with 39 completed weeks gestation        Assessment:  1.  Intrauterine pregnancy at 39w1d gestation with reassuring fetal status.  Some decelerations after, resolved with the typical resuscitative measures  2.  AROM, clear, IUPC and fetal scalp electrode placed  3.  Obstetrical history significant for is non-contributory.  4.  GBS status: Negative  Plan:  1. Vaginal anticipated  2. Plan of care has been reviewed with patient and support persons at bedside  3.  Risks, benefits of treatment plan have been discussed.  4.  All questions have been answered.  5.  We will start Pitocin if indicated.  Epidural when desired      Cathleen Comer MD  10/17/2023  11:04 EDT

## 2023-10-17 NOTE — PROGRESS NOTES
Called by RN for prolonged decel;  baby down for 6.5min with shirley in 70s;  pt positioned, given ephedrine and terb;  baby recovered to 120s;  cont current care

## 2023-10-17 NOTE — ANESTHESIA PREPROCEDURE EVALUATION
Anesthesia Evaluation     Patient summary reviewed and Nursing notes reviewed                Airway   Mallampati: I  TM distance: >3 FB  Neck ROM: full  No difficulty expected  Dental      Pulmonary - negative pulmonary ROS and normal exam    breath sounds clear to auscultation  Cardiovascular - negative cardio ROS and normal exam    Rhythm: regular  Rate: normal        Neuro/Psych  (+) headaches, psychiatric history Anxiety  GI/Hepatic/Renal/Endo - negative ROS     Musculoskeletal (-) negative ROS    Abdominal    Substance History - negative use     OB/GYN    (+) Pregnant        Other                          Anesthesia Plan    ASA 2     epidural       Anesthetic plan, risks, benefits, and alternatives have been provided, discussed and informed consent has been obtained with: patient.        CODE STATUS:    Code Status (Patient has no pulse and is not breathing): CPR (Attempt to Resuscitate)  Medical Interventions (Patient has pulse or is breathing): Full Support

## 2023-10-17 NOTE — NURSING NOTE
Prolonged decel from 8514-8198 turned to rt side, lt side and hands and knees.  Dr Ceballos in to eval pt.  Ephedrine given, Terb given- see MAR.  FHT returned to 120's after interventions.  Dr Comer notified of all of the above by Dr Ceballos.

## 2023-10-17 NOTE — PLAN OF CARE
Problem: Adult Inpatient Plan of Care  Goal: Plan of Care Review  Outcome: Ongoing, Progressing  Goal: Patient-Specific Goal (Individualized)  Outcome: Ongoing, Progressing  Goal: Absence of Hospital-Acquired Illness or Injury  Outcome: Ongoing, Progressing  Goal: Optimal Comfort and Wellbeing  Outcome: Ongoing, Progressing  Intervention: Provide Person-Centered Care  Description: Use a family-focused approach to care.  Develop trust and rapport by proactively providing information, encouraging questions, addressing concerns and offering reassurance.  Acknowledge emotional response to hospitalization.  Recognize and utilize personal coping strategies.  Honor spiritual and cultural preferences.  Recent Flowsheet Documentation  Taken 10/17/2023 2826 by Angelica Herrera RN  Trust Relationship/Rapport:   care explained   choices provided   emotional support provided   empathic listening provided   questions answered   questions encouraged   reassurance provided   thoughts/feelings acknowledged  Goal: Readiness for Transition of Care  Outcome: Ongoing, Progressing     Problem: Bleeding (Labor)  Goal: Hemostasis  Outcome: Ongoing, Progressing     Problem: Change in Fetal Wellbeing (Labor)  Goal: Stable Fetal Wellbeing  Outcome: Ongoing, Progressing     Problem: Delayed Labor Progression (Labor)  Goal: Effective Progression to Delivery  Outcome: Ongoing, Progressing     Problem: Infection (Labor)  Goal: Absence of Infection Signs and Symptoms  Outcome: Ongoing, Progressing     Problem: Labor Pain (Labor)  Goal: Acceptable Pain Control  Outcome: Ongoing, Progressing     Problem: Uterine Tachysystole (Labor)  Goal: Normal Uterine Contraction Pattern  Outcome: Ongoing, Progressing   Goal Outcome Evaluation:

## 2023-10-17 NOTE — PROGRESS NOTES
Called by RN for decel;  strip with good variability and R, cat 1 then pt c/o increasing pain;  pt was moved for SVE and decel happened;  lasted 5 min with shirley to 80s;  baby with spontaneous return with position changes and d/c pit;  SVE:  c/7-8/0-+1 but checked on hands and knees.

## 2023-10-17 NOTE — PROGRESS NOTES
" [facility]  Obstetric Progress Note    Subjective     Patient:    The patient feels pressure and pelvic pain.      Objective     Vital Signs Range for the last 24 hours          BP: (123-131)/(71-82) 131/82   Heart Rate:  [67-76] 76   Resp:  [16] 16               Weight:  [79.4 kg (175 lb)] 79.4 kg (175 lb)       Flowsheet Rows      Flowsheet Row First Filed Value   Admission Height 157.5 cm (62\") Documented at 10/17/2023 0610   Admission Weight 79.4 kg (175 lb) Documented at 10/17/2023 0610            Intake/Output last 24 hours:    No intake or output data in the 24 hours ending 10/17/23 1842    Intake/Output this shift:    No intake/output data recorded.    Physical Exam:  General: Patient is in no acute distress   Heart CVS exam: normal rate, regular rhythm, normal S1, S2, no murmurs, rubs, clicks or gallops.   Lungs Chest: clear to auscultation, no wheezes, rales or rhonchi, symmetric air entry.     Abdomen Abdominal exam: soft, nontender, nondistended, no masses or organomegaly.   Extremities Exam of extremities: peripheral pulses normal, no pedal edema, no clubbing or cyanosis     Presentation: Vertex   Cervix: Exam by: Method: sterile exam per physician   Dilation: Cervical Dilation (cm): 10   Effacement: Cervical Effacement: 100%   Station: Fetal Station: 1-->-2         Fetal Heart Rate Assessment   Method: Fetal HR Assessment Method: fetal spiral electrode   Beats/min: Fetal HR (beats/min): 120   Baseline: Fetal HR Baseline: normal range   Variability: Fetal HR Variability: moderate (amplitude range 6 to 25 bpm)   Accels: Fetal HR Accelerations: greater than/equal to 15 bpm, lasting at least 15 seconds   Decels: Fetal HR Decelerations: absent   Tracing Category: Fetal HR Tracing Category: Category I     Uterine Assessment   Method: Method: IUPC (intrauterine pressure catheter)   Frequency (min): Contraction Frequency (Minutes): 2-3   Ctx Count in 10 min:     Duration:     Intensity: Contraction Intensity: " strong by palpation   Intensity by IUPC: Contraction Intensity (mmHg) by IUPC: 50   Resting Tone: Uterine Resting Tone: soft by palpation   Resting Tone by IUPC: Uterine Resting Tone (mmHg) by IUPC: 25   Caroline Units:         Assessment & Plan       Pregnancy with 39 completed weeks gestation        Assessment:  1.  Intrauterine pregnancy at 39w1d gestation with reactive fetal status.    2.  Progressing to stage II labor  3.  Obstetrical history significant for is non-contributory.  4.  GBS status: Negative    Plan:  1. Vaginal anticipated  2. Plan of care has been reviewed with patient and support persons at bedside  3.  Risks, benefits of treatment plan have been discussed.  4.  All questions have been answered.        Cathleen Comer MD  10/17/2023  18:42 EDT

## 2023-10-17 NOTE — NURSING NOTE
Dr Comer in room for AROM pt had a decel from 0926-3056 immediately after AROM and placement of internals.  Dr Comer at bedside.  Pt repositioned to rt side and to Lt side.  FHR recovered to 110 with interventions. Dr Comer at bedside during this time

## 2023-10-18 RX ORDER — CALCIUM CARBONATE 500 MG/1
1 TABLET, CHEWABLE ORAL 3 TIMES DAILY PRN
Status: DISCONTINUED | OUTPATIENT
Start: 2023-10-18 | End: 2023-10-19 | Stop reason: HOSPADM

## 2023-10-18 RX ORDER — IBUPROFEN 600 MG/1
600 TABLET ORAL EVERY 6 HOURS PRN
Status: DISCONTINUED | OUTPATIENT
Start: 2023-10-18 | End: 2023-10-19 | Stop reason: HOSPADM

## 2023-10-18 RX ORDER — HYDROCODONE BITARTRATE AND ACETAMINOPHEN 10; 325 MG/1; MG/1
1 TABLET ORAL EVERY 4 HOURS PRN
Status: DISCONTINUED | OUTPATIENT
Start: 2023-10-18 | End: 2023-10-19 | Stop reason: HOSPADM

## 2023-10-18 RX ORDER — ONDANSETRON 4 MG/1
4 TABLET, FILM COATED ORAL EVERY 8 HOURS PRN
Status: DISCONTINUED | OUTPATIENT
Start: 2023-10-18 | End: 2023-10-19 | Stop reason: HOSPADM

## 2023-10-18 RX ORDER — ACETAMINOPHEN 325 MG/1
650 TABLET ORAL EVERY 6 HOURS PRN
Status: DISCONTINUED | OUTPATIENT
Start: 2023-10-18 | End: 2023-10-19 | Stop reason: HOSPADM

## 2023-10-18 RX ORDER — HYDROCODONE BITARTRATE AND ACETAMINOPHEN 5; 325 MG/1; MG/1
1 TABLET ORAL EVERY 4 HOURS PRN
Status: DISCONTINUED | OUTPATIENT
Start: 2023-10-18 | End: 2023-10-19 | Stop reason: HOSPADM

## 2023-10-18 RX ORDER — OXYTOCIN/0.9 % SODIUM CHLORIDE 30/500 ML
125 PLASTIC BAG, INJECTION (ML) INTRAVENOUS CONTINUOUS PRN
Status: DISCONTINUED | OUTPATIENT
Start: 2023-10-18 | End: 2023-10-19 | Stop reason: HOSPADM

## 2023-10-18 RX ORDER — DOCUSATE SODIUM 100 MG/1
100 CAPSULE, LIQUID FILLED ORAL DAILY
Status: DISCONTINUED | OUTPATIENT
Start: 2023-10-18 | End: 2023-10-19 | Stop reason: HOSPADM

## 2023-10-18 RX ORDER — FERROUS SULFATE 325(65) MG
325 TABLET ORAL 2 TIMES DAILY WITH MEALS
Status: DISCONTINUED | OUTPATIENT
Start: 2023-10-18 | End: 2023-10-19 | Stop reason: HOSPADM

## 2023-10-18 RX ADMIN — METOPROLOL SUCCINATE 25 MG: 25 TABLET, EXTENDED RELEASE ORAL at 08:23

## 2023-10-18 RX ADMIN — FERROUS SULFATE TAB 325 MG (65 MG ELEMENTAL FE) 325 MG: 325 (65 FE) TAB at 18:46

## 2023-10-18 RX ADMIN — IBUPROFEN 600 MG: 600 TABLET, FILM COATED ORAL at 05:57

## 2023-10-18 RX ADMIN — IBUPROFEN 600 MG: 600 TABLET, FILM COATED ORAL at 11:46

## 2023-10-18 RX ADMIN — FERROUS SULFATE TAB 325 MG (65 MG ELEMENTAL FE) 325 MG: 325 (65 FE) TAB at 08:22

## 2023-10-18 RX ADMIN — IBUPROFEN 600 MG: 600 TABLET, FILM COATED ORAL at 18:46

## 2023-10-18 RX ADMIN — PAROXETINE HYDROCHLORIDE 10 MG: 10 TABLET, FILM COATED ORAL at 06:00

## 2023-10-18 RX ADMIN — DOCUSATE SODIUM 100 MG: 100 CAPSULE, LIQUID FILLED ORAL at 08:22

## 2023-10-18 NOTE — PROGRESS NOTES
"CHUCKIE [facility]   Vaginal Delivery Progress Note    Subjective   Postpartum Day 1: Vaginal Delivery    The patient feels well.  Her pain is well controlled with nonsteroidal anti-inflammatory drugs.   She is ambulating well.  Patient describes her bleeding as moderate lochia.    Breastfeeding: infant latching.    Objective     Vital Signs Range for the last 24 hours  Temperature: Temp:  [97.8 °F (36.6 °C)-98.8 °F (37.1 °C)] 97.8 °F (36.6 °C)   Temp Source: Temp src: Oral   BP: BP: ()/() 126/74   Pulse: Heart Rate:  [] 90   Respirations: Resp:  [14-16] 14   SPO2: SpO2:  [96 %-100 %] 100 %   O2 Amount (l/min):     O2 Devices     Weight:       Admit Height:  Height: 157.5 cm (62\")      Physical Exam:  General:  no acute distress.  Abdomen: Fundus: appropriate, firm, non tender  Extremities: normal, atraumatic, no cyanosis, and trace edema.       Lab results reviewed:  Yes   Rubella:  Immune; 42 on 04/06/2023  Rh Status:    RH type   Date Value Ref Range Status   10/17/2023 Positive  Final     Immunizations:   Immunization History   Administered Date(s) Administered    DTaP, Unspecified 2002, 2002, 2002, 12/22/2003, 08/11/2006    HPV Quadrivalent 07/19/2013, 04/26/2014, 02/10/2015    Hep A, 2 Dose 04/26/2014, 02/10/2015, 08/22/2018    Hep B, Adolescent or Pediatric 2002, 2002, 2002    Hib (PRP-OMP) 2002    IPV 2002, 2002, 2002, 2002, 08/11/2006    Influenza TIV (IM) 04/26/2014, 11/23/2015    Influenza, Unspecified 11/07/2019, 12/08/2020    MCV4 Unspecified 07/19/2013    MMR 08/20/2003, 08/11/2006    Meningococcal MCV4P (Menactra) 07/19/2013    Tdap 07/19/2013    Varicella 08/20/2003, 04/18/2013    flucelvax quad pfs =>4 YRS 11/07/2019, 12/08/2020       Assessment & Plan       Postpartum care and examination immediately after delivery      Vaginal, Spontaneous   .      Plan:  Continue current care.      Cathleen Comer, " MD  10/18/2023  08:07 EDT

## 2023-10-18 NOTE — L&D DELIVERY NOTE
[facility]   Vaginal Delivery Note    Patient Name: Kristina Parson  : 2000  MRN: [id number]    Date of Delivery: 10/17/2023     Diagnosis     Pre & Post-Delivery:  Intrauterine pregnancy at 39w1d  Labor status:      Pregnancy with 39 completed weeks gestation             Problem List    Transfer to Postpartum     Review the Delivery Report for details.     Delivery     Delivery: Vaginal, Spontaneous     YOB: 2021    Time of Birth:  Gestational Age 9:02 PM   39w1d     Anesthesia: Epidural     Delivering clinician: Cathleen Comer    Forceps?   No   Vacuum? No    Shoulder dystocia present: No        Delivery narrative: Induction at 39 weeks.  Received 1 dose of Cytotec 25 mics vaginal.  Had AROM around 4 cm for clear fluid.  Internal monitors were placed.  She received epidural analgesia.  There were several episodes of fetal heart rate decelerations that improved with resuscitative measures.  Pitocin was utilized minimally.  She delivered a liveborn male infant over a midline episiotomy he was bulb suction at perineum.  He was laid on the mother's abdomen where he was attended to by the nursery personnel.  After approximately 1 minute the umbilical cord was clamped in 2 places.  It was then cut by the father.  Samples of blood were collected from the umbilical cord for arterial and venous cord f blood analysis and Deric testing.  Spontaneous delivery of intact placenta with three-vessel cord and trailing membranes.  30 cc of 1% lidocaine was infiltrated to perform the episiotomy and in anticipation of the repair.  The uterine cavity was explored with a sponge on a stick and the fundus was massaged and noted to be firm.  Repair of the episiotomy was carried out by 3-0 Monocryl in layers.  The tissues were quite friable.  Several of this lacerations vaginal lacerations near the episiotomy were repaired with 3-0 Monocryl used in figure-of-eight technique.  DEVAN performed intact.    cc.  Mom and infant are engaged in Kangaroo care.  They are in stable condition.  Placenta will be discarded.      Infant     Findings: male  infant     Infant observations: Weight: 2695 g (5 lb 15.1 oz)   Length: 20  in  Observations/Comments:        Apgars: 7  @ 1 minute /    8  @ 5 minutes   Infant Name: Adorno     Placenta & Cord         Placenta delivered  Spontaneous  at   10/17/2023  9:13 PM     Cord: 3 vessels  present.   Nuchal Cord?  no   Cord blood obtained: Yes    Cord gases obtained:  Yes    Cord gas results: Venous:    pH, Cord Venous   Date Value Ref Range Status   10/17/2023 7.313 7.310 - 7.370 pH Units Final     Base Excess, Cord Venous   Date Value Ref Range Status   10/17/2023 -2.7 (L) -2.0 - 2.0 mmol/L Final       Arterial:    pH, Cord Arterial   Date Value Ref Range Status   10/17/2023 7.32 (H) 7.21 - 7.31 pH Units Final     Base Exc, Cord Arterial   Date Value Ref Range Status   10/17/2023 -3.7 (L) -2.0 - 2.0 mmol/L Final        Repair     Episiotomy: Median     Yes  Suture used for repair: 3-0 Monocryl   Lacerations: Yes  Laceration Information  Laceration Repaired?   Perineal: None      Periurethral:       Labial:       Sulcus:       Vaginal: Yes  No    Cervical:         Suture used for repair: 3-0 Monocryl  Laceration Length for 3rd or 4th degree lacerations: Not applicable   Estimated Blood Loss: Est. Blood Loss (mL): 250 mL (Filed from Delivery Summary) (10/17/23 2102)     Quantitative Blood Loss:          Complications     none    Disposition     Mother to Remain in LD  in stable condition currently.  Baby to remains with mom  in stable condition currently.    Cathleen Comer MD  10/17/23  21:58 EDT

## 2023-10-18 NOTE — ANESTHESIA POSTPROCEDURE EVALUATION
Patient: Kristina Parson    Procedure Summary       Date: 10/17/23 Room / Location:     Anesthesia Start: 1133 Anesthesia Stop: 2102    Procedure: LABOR ANALGESIA Diagnosis:     Scheduled Providers:  Provider: Godfrey Lewis MD    Anesthesia Type: epidural ASA Status: 2            Anesthesia Type: epidural    Vitals  Vitals Value Taken Time   /74 10/18/23 0610   Temp 36.6 °C (97.8 °F) 10/18/23 0610   Pulse 90 10/18/23 0610   Resp 14 10/18/23 0610   SpO2 96 % 10/17/23 2207   Vitals shown include unfiled device data.        Post Anesthesia Care and Evaluation    Patient location during evaluation: bedside  Patient participation: complete - patient participated  Level of consciousness: awake  Pain management: adequate    Airway patency: patent  PONV Status: controlled  Cardiovascular status: acceptable and stable  Respiratory status: acceptable  Hydration status: acceptable  Post Neuraxial Block status: Motor and sensory function returned to baseline and No signs or symptoms of PDPH    
scds b/l lower ext

## 2023-10-18 NOTE — PLAN OF CARE
Problem: Adult Inpatient Plan of Care  Goal: Plan of Care Review  Outcome: Ongoing, Progressing  Flowsheets (Taken 10/18/2023 1856)  Progress: improving  Plan of Care Reviewed With: patient  Goal: Absence of Hospital-Acquired Illness or Injury  Intervention: Identify and Manage Fall Risk  Recent Flowsheet Documentation  Taken 10/18/2023 0835 by Randee Ortiz  Safety Promotion/Fall Prevention: safety round/check completed  Intervention: Prevent Skin Injury  Recent Flowsheet Documentation  Taken 10/18/2023 0835 by Randee Ortiz  Body Position: position changed independently  Intervention: Prevent and Manage VTE (Venous Thromboembolism) Risk  Recent Flowsheet Documentation  Taken 10/18/2023 0835 by Randee Ortiz  Activity Management: up ad sadiq  Goal: Optimal Comfort and Wellbeing  Intervention: Provide Person-Centered Care  Recent Flowsheet Documentation  Taken 10/18/2023 0835 by Randee Ortiz  Trust Relationship/Rapport:   care explained   choices provided   emotional support provided   empathic listening provided   questions answered   questions encouraged   reassurance provided   thoughts/feelings acknowledged   Goal Outcome Evaluation:  Plan of Care Reviewed With: patient        Progress: improving

## 2023-10-19 VITALS
RESPIRATION RATE: 17 BRPM | HEIGHT: 62 IN | OXYGEN SATURATION: 100 % | SYSTOLIC BLOOD PRESSURE: 129 MMHG | DIASTOLIC BLOOD PRESSURE: 62 MMHG | WEIGHT: 175 LBS | BODY MASS INDEX: 32.2 KG/M2 | HEART RATE: 75 BPM | TEMPERATURE: 98.4 F

## 2023-10-19 RX ORDER — IBUPROFEN 600 MG/1
600 TABLET ORAL EVERY 6 HOURS PRN
Qty: 30 TABLET | Refills: 0 | Status: SHIPPED | OUTPATIENT
Start: 2023-10-19

## 2023-10-19 RX ORDER — ACETAMINOPHEN 325 MG/1
650 TABLET ORAL EVERY 6 HOURS PRN
Qty: 20 TABLET | Refills: 0 | Status: SHIPPED | OUTPATIENT
Start: 2023-10-19

## 2023-10-19 RX ADMIN — DOCUSATE SODIUM 100 MG: 100 CAPSULE, LIQUID FILLED ORAL at 08:09

## 2023-10-19 RX ADMIN — IBUPROFEN 600 MG: 600 TABLET, FILM COATED ORAL at 02:10

## 2023-10-19 RX ADMIN — METOPROLOL SUCCINATE 25 MG: 25 TABLET, EXTENDED RELEASE ORAL at 08:09

## 2023-10-19 RX ADMIN — PAROXETINE HYDROCHLORIDE 10 MG: 10 TABLET, FILM COATED ORAL at 08:09

## 2023-10-19 RX ADMIN — FERROUS SULFATE TAB 325 MG (65 MG ELEMENTAL FE) 325 MG: 325 (65 FE) TAB at 08:09

## 2023-10-19 NOTE — PLAN OF CARE
Problem: Adult Inpatient Plan of Care  Goal: Plan of Care Review  Outcome: Ongoing, Progressing  Goal: Patient-Specific Goal (Individualized)  Outcome: Ongoing, Progressing  Goal: Absence of Hospital-Acquired Illness or Injury  Outcome: Ongoing, Progressing  Intervention: Identify and Manage Fall Risk  Recent Flowsheet Documentation  Taken 10/18/2023 2145 by Daria Loya RNA  Safety Promotion/Fall Prevention: safety round/check completed  Taken 10/18/2023 1922 by Daria Loya RNA  Safety Promotion/Fall Prevention: safety round/check completed  Intervention: Prevent and Manage VTE (Venous Thromboembolism) Risk  Recent Flowsheet Documentation  Taken 10/18/2023 2145 by Daria Loya RNA  Activity Management: up ad sadiq  Goal: Optimal Comfort and Wellbeing  Outcome: Ongoing, Progressing  Intervention: Monitor Pain and Promote Comfort  Recent Flowsheet Documentation  Taken 10/19/2023 0210 by Vincenzo, Carolina A, RNA  Pain Management Interventions: see MAR  Goal: Readiness for Transition of Care  Outcome: Ongoing, Progressing     Problem: Breastfeeding  Goal: Effective Breastfeeding  Outcome: Ongoing, Progressing     Problem: Adjustment to Role Transition (Postpartum Vaginal Delivery)  Goal: Successful Maternal Role Transition  Outcome: Ongoing, Progressing     Problem: Bleeding (Postpartum Vaginal Delivery)  Goal: Hemostasis  Outcome: Ongoing, Progressing     Problem: Infection (Postpartum Vaginal Delivery)  Goal: Absence of Infection Signs/Symptoms  Outcome: Ongoing, Progressing     Problem: Pain (Postpartum Vaginal Delivery)  Goal: Acceptable Pain Control  Outcome: Ongoing, Progressing  Intervention: Prevent or Manage Pain  Recent Flowsheet Documentation  Taken 10/19/2023 0210 by Vincenzo, Carolina A, RNA  Pain Management Interventions: see MAR     Problem: Urinary Retention (Postpartum Vaginal Delivery)  Goal: Effective Urinary Elimination  Outcome: Ongoing, Progressing   Goal Outcome  Evaluation:

## 2023-10-19 NOTE — LACTATION NOTE
LC in to follow up with breastfeeding progress. It became too painful to latch yesterday so patient began pumping and supplementing with DEBM and MOM. Her breasts are mckenzie today and nipples are less painful and less red so she asked for assistance with latching. Baby still likes to pull off towards the tip of the nipple but did settle down more quickly and relax at this feeding. Baby fed on both sides with mild discomfort. LC discussed with patient that she may need lactation follow up and should let lactation know if nipples begin to get more damaged. Patient is planning on discharge today. LC discussed normal infant output patterns to expect and unlimited time/access to the breast but if infant is not waking by 3 hours to wake and feed using measures shown in the hospital. LC discussed checking to make sure new medications are safe to breastfeed. LC discussed alcohol use and cigarette/second hand smoke around baby and breastfeeding and discussed the impact of street drugs on infants and breastfeeding. LC used the page in the breastfeeding guide to discuss harmful effects of these. Breastfeeding/Lactation expectations and anticipatory guidance discussed for the next two weeks . LC discussed nipple care, plugged ducts, engorgement, and breast infection. LC encouraged mom to see pediatrician two days from discharge for follow up. Patient has a breastpump for home use and LC discussed good pumping guidelines and normal expectations with pumping and storage and preparation of ebm for feedings. LC discussed breastfeeding/lactation resources including the local breastfeeding support group after discharge and when to call the doctor. Patient showed good understanding.

## 2023-10-19 NOTE — DISCHARGE SUMMARY
" Wh[facility]  Delivery Discharge Summary    Primary OB Clinician:     EDC: Estimated Date of Delivery: 10/23/23    Admitting Diagnosis:  Pregnancy with 39 completed weeks gestation [Z3A.39]    Discharge Diagnosis:  Same as Admitting plus:   Pregnancy at 39w1d - Delivered     Antepartum complications: none    Date of Delivery: 10/17/2023   Time of Delivery: 9:02 PM     Delivered By:  Cathleen Comer     Delivery Type: Vaginal, Spontaneous      Tubal Ligation: n/a    Baby:male  infant;   Apgar:  7  @ 1 minute /   Apgar:  8  @ 5 minutes   Weight: 2695 g (5 lb 15.1 oz)    Length: 20     Anesthesia: Epidural      Intrapartum complications: None    Laceration: Yes  Laceration Information  Laceration Repaired?   Perineal: None      Periurethral:       Labial:       Sulcus:       Vaginal: Yes  No    Cervical:         Suture used for repair: 3-0 Monocryl    Episiotomy: Yes [unfilled]    Placenta: Spontaneous     Feeding method: Breastfeeding Status: Yes    Rh Immune globulin given: not applicable    Rubella vaccine given: not applicable    H Whittaker  Vaginal Delivery Progress Note    Subjective   Postpartum Day 2: Vaginal Delivery    The patient feels well.  Her pain is well controlled with nonsteroidal anti-inflammatory drugs.   She is ambulating well.  Patient describes her bleeding as moderate lochia.    Breastfeeding: infant latching.    Objective     Vital Signs Range for the last 24 hours  Temperature: Temp:  [98.1 °F (36.7 °C)-98.3 °F (36.8 °C)] 98.1 °F (36.7 °C)   Temp Source: Temp src: Oral   BP: BP: (115-123)/(50-78) 123/78   Pulse: Heart Rate:  [] 117   Respirations: Resp:  [16] 16   SPO2:     O2 Amount (l/min):     O2 Devices Device (Oxygen Therapy): room air   Weight:       Admit Height:  Height: 157.5 cm (62\")      Physical Exam:  General:  no acute distress.  Abdomen: Fundus: appropriate, firm, non tender  Extremities: normal, atraumatic, no cyanosis, and trace edema.       Hospital course:     " Presented at 39 weeks 1 day for a scheduled induction at term.  Received vaginal Cytotec and artificial rupture of membranes.  She had minimal Pitocin and progressed to have a spontaneous vaginal delivery over a midline episiotomy.  She has done well during the postpartum period.  She has met the expected milestones tolerating p.o. foods and fluids and voiding.  She reports adequate pain management.  She has been discharged home in satisfactory condition.  She will maintain pelvic rest.  Follow-up in office in 2 weeks.  Call if she has issues with infection, excessive bleeding or depression that is not managed by her current regimen.  She has been discharged home in satisfactory condition.  Discharge Date: 10/19/2023; Discharge Time: 09:00 EDT        Plan:      Follow-up appointment with Dr. Comer in 2 weeks.     Electronically signed by Cathleen Comer MD, 10/19/23, 9:00 AM EDT.

## 2023-10-30 ENCOUNTER — TELEPHONE (OUTPATIENT)
Dept: LACTATION | Facility: HOSPITAL | Age: 23
End: 2023-10-30
Payer: COMMERCIAL

## 2023-10-30 NOTE — TELEPHONE ENCOUNTER
BAM called to check on this patient and her breastfeeding progress. Patient states she is latching some and pumping and bottle feeding some. She has no concerns with lactation at this time but states she understands how to reach lactation for any needs.

## 2023-12-05 ENCOUNTER — TRANSCRIBE ORDERS (OUTPATIENT)
Dept: LAB | Facility: HOSPITAL | Age: 23
End: 2023-12-05
Payer: COMMERCIAL

## 2023-12-05 DIAGNOSIS — Z97.5 CONTRACEPTION, DEVICE INTRAUTERINE: Primary | ICD-10-CM

## 2024-01-12 ENCOUNTER — LAB (OUTPATIENT)
Dept: LAB | Facility: HOSPITAL | Age: 24
End: 2024-01-12
Payer: COMMERCIAL

## 2024-01-12 DIAGNOSIS — Z97.5 CONTRACEPTION, DEVICE INTRAUTERINE: ICD-10-CM

## 2024-01-12 LAB — HCG SERPL QL: NEGATIVE

## 2024-01-12 PROCEDURE — 36415 COLL VENOUS BLD VENIPUNCTURE: CPT

## 2024-01-12 PROCEDURE — 84703 CHORIONIC GONADOTROPIN ASSAY: CPT

## 2024-02-08 ENCOUNTER — OFFICE VISIT (OUTPATIENT)
Dept: FAMILY MEDICINE CLINIC | Facility: CLINIC | Age: 24
End: 2024-02-08
Payer: COMMERCIAL

## 2024-02-08 VITALS
RESPIRATION RATE: 18 BRPM | SYSTOLIC BLOOD PRESSURE: 105 MMHG | HEART RATE: 101 BPM | OXYGEN SATURATION: 99 % | WEIGHT: 151.6 LBS | HEIGHT: 62 IN | BODY MASS INDEX: 27.9 KG/M2 | DIASTOLIC BLOOD PRESSURE: 79 MMHG | TEMPERATURE: 98.4 F

## 2024-02-08 DIAGNOSIS — F41.1 GAD (GENERALIZED ANXIETY DISORDER): Chronic | ICD-10-CM

## 2024-02-08 DIAGNOSIS — E66.3 OVERWEIGHT (BMI 25.0-29.9): ICD-10-CM

## 2024-02-08 DIAGNOSIS — J01.10 ACUTE NON-RECURRENT FRONTAL SINUSITIS: ICD-10-CM

## 2024-02-08 DIAGNOSIS — R05.9 COUGH, UNSPECIFIED TYPE: Primary | ICD-10-CM

## 2024-02-08 DIAGNOSIS — R00.0 TACHYCARDIA: ICD-10-CM

## 2024-02-08 DIAGNOSIS — R09.81 NASAL CONGESTION: ICD-10-CM

## 2024-02-08 LAB
EXPIRATION DATE: NORMAL
FLUAV AG UPPER RESP QL IA.RAPID: NOT DETECTED
FLUBV AG UPPER RESP QL IA.RAPID: NOT DETECTED
INTERNAL CONTROL: NORMAL
Lab: 8983
SARS-COV-2 AG UPPER RESP QL IA.RAPID: NOT DETECTED

## 2024-02-08 RX ORDER — METHYLPREDNISOLONE 4 MG/1
TABLET ORAL
Qty: 21 TABLET | Refills: 0 | Status: SHIPPED | OUTPATIENT
Start: 2024-02-08

## 2024-02-08 RX ORDER — PAROXETINE HYDROCHLORIDE 20 MG/1
TABLET, FILM COATED ORAL
COMMUNITY
Start: 2023-12-12

## 2024-02-08 RX ORDER — AZITHROMYCIN 250 MG/1
TABLET, FILM COATED ORAL
Qty: 6 TABLET | Refills: 0 | Status: SHIPPED | OUTPATIENT
Start: 2024-02-08

## 2024-02-08 NOTE — ASSESSMENT & PLAN NOTE
Psychological condition is unchanged.  Regular aerobic exercise.  Psychological condition  will be reassessed at the next regular appointment.

## 2024-02-08 NOTE — PROGRESS NOTES
"Chief Complaint  Cough (Symptoms started Tuesday evening ), Nasal Congestion, sneezing, and Earache (Bilateral earache )    Subjective        Kristina Parson presents to [facility]  History of Present Illness  Pt presents c/o cough, nasal congestion, sneezing, sore throat, bilat ear pain, HA x 3 days. Denies fever, chills, SOB, chest pain, n/v/d, or other. Has not taken anything to treat. No known sick contacts. Pt with 3 month old son. She is not breastfeeding.     Reviewed all recent labs and medications.      Objective   Vital Signs:  /79   Pulse 101   Temp 98.4 °F (36.9 °C) (Temporal)   Resp 18   Ht 157.5 cm (62\")   Wt 68.8 kg (151 lb 9.6 oz)   SpO2 99%   BMI 27.73 kg/m²   Estimated body mass index is 27.73 kg/m² as calculated from the following:    Height as of this encounter: 157.5 cm (62\").    Weight as of this encounter: 68.8 kg (151 lb 9.6 oz).               Physical Exam  Vitals reviewed.   Constitutional:       General: She is not in acute distress.  HENT:      Head: Normocephalic.      Right Ear: Tympanic membrane normal.      Left Ear: Tympanic membrane normal.      Nose: Nose normal. Congestion present.      Right Sinus: Maxillary sinus tenderness present.      Left Sinus: Maxillary sinus tenderness present.      Mouth/Throat:      Pharynx: Oropharynx is clear. Posterior oropharyngeal erythema present.   Eyes:      General: No scleral icterus.     Extraocular Movements: Extraocular movements intact.      Conjunctiva/sclera: Conjunctivae normal.      Pupils: Pupils are equal, round, and reactive to light.   Cardiovascular:      Rate and Rhythm: Normal rate and regular rhythm.      Pulses: Normal pulses.      Heart sounds: Normal heart sounds.   Pulmonary:      Effort: Pulmonary effort is normal.      Breath sounds: Normal breath sounds.   Abdominal:      General: Bowel sounds are normal.      Palpations: Abdomen is soft.   Musculoskeletal:         General: " Normal range of motion.      Cervical back: Neck supple.   Skin:     General: Skin is warm and dry.   Neurological:      Mental Status: She is alert and oriented to person, place, and time.   Psychiatric:         Mood and Affect: Mood normal.         Behavior: Behavior normal.         Thought Content: Thought content normal.         Judgment: Judgment normal.        Result Review :      Common labs          7/12/2023    11:22 10/17/2023    06:28   Common Labs   Glucose 92     BUN 5     Creatinine 0.57     Sodium 136     Potassium 3.7     Chloride 103     Calcium 9.2     Albumin 3.5     Total Bilirubin <0.2     Alkaline Phosphatase 69     AST (SGOT) 17     ALT (SGPT) 12     WBC 12.43  13.87    Hemoglobin 11.5  13.1    Hematocrit 34.1  40.1    Platelets 172  162      Data reviewed : Consultant notes ob/gyn, cardiology              Assessment and Plan     Diagnoses and all orders for this visit:    1. Cough, unspecified type (Primary)  -     POCT SARS-CoV-2 Antigen MIKE + Flu    2. Nasal congestion  -     POCT SARS-CoV-2 Antigen MIKE + Flu    3. POLI (generalized anxiety disorder)  Assessment & Plan:  Psychological condition is unchanged.  Regular aerobic exercise.  Psychological condition  will be reassessed at the next regular appointment.       4. Tachycardia    5. Overweight (BMI 25.0-29.9)  Assessment & Plan:  Patient's (Body mass index is 27.73 kg/m².) indicates that they are overweight with health conditions that include none . Weight is improving with lifestyle modifications. BMI is is above average; BMI management plan is completed. We discussed portion control and increasing exercise.       6. Acute non-recurrent frontal sinusitis  Comments:  zpack UAD   medrol dose twin UAD   increase rest/clear fluids    Other orders  -     azithromycin (Zithromax Z-Twin) 250 MG tablet; UAD  Dispense: 6 tablet; Refill: 0  -     methylPREDNISolone (MEDROL) 4 MG dose pack; Take as directed on package instructions.  Dispense: 21  tablet; Refill: 0             Follow Up     Return if symptoms worsen or fail to improve.  Patient was given instructions and counseling regarding her condition or for health maintenance advice. Please see specific information pulled into the AVS if appropriate.

## 2024-02-08 NOTE — ASSESSMENT & PLAN NOTE
Patient's (Body mass index is 27.73 kg/m².) indicates that they are overweight with health conditions that include none . Weight is improving with lifestyle modifications. BMI is is above average; BMI management plan is completed. We discussed portion control and increasing exercise.

## 2024-02-09 ENCOUNTER — PATIENT ROUNDING (BHMG ONLY) (OUTPATIENT)
Dept: FAMILY MEDICINE CLINIC | Facility: CLINIC | Age: 24
End: 2024-02-09
Payer: COMMERCIAL

## 2024-02-09 NOTE — PROGRESS NOTES
A My-Chart message has been sent to the patient for PATIENT ROUNDING with [facility].

## 2024-03-19 ENCOUNTER — OFFICE VISIT (OUTPATIENT)
Dept: FAMILY MEDICINE CLINIC | Facility: CLINIC | Age: 24
End: 2024-03-19
Payer: COMMERCIAL

## 2024-03-19 VITALS
WEIGHT: 148 LBS | HEART RATE: 68 BPM | SYSTOLIC BLOOD PRESSURE: 100 MMHG | BODY MASS INDEX: 27.23 KG/M2 | TEMPERATURE: 97.8 F | RESPIRATION RATE: 18 BRPM | HEIGHT: 62 IN | OXYGEN SATURATION: 100 % | DIASTOLIC BLOOD PRESSURE: 60 MMHG

## 2024-03-19 DIAGNOSIS — F41.1 GAD (GENERALIZED ANXIETY DISORDER): Chronic | ICD-10-CM

## 2024-03-19 DIAGNOSIS — R52 GENERALIZED BODY ACHES: Primary | ICD-10-CM

## 2024-03-19 DIAGNOSIS — R05.1 ACUTE COUGH: ICD-10-CM

## 2024-03-19 DIAGNOSIS — J01.00 ACUTE NON-RECURRENT MAXILLARY SINUSITIS: ICD-10-CM

## 2024-03-19 DIAGNOSIS — E66.3 OVERWEIGHT (BMI 25.0-29.9): ICD-10-CM

## 2024-03-19 RX ORDER — AMOXICILLIN AND CLAVULANATE POTASSIUM 875; 125 MG/1; MG/1
1 TABLET, FILM COATED ORAL 2 TIMES DAILY
Qty: 14 TABLET | Refills: 0 | Status: SHIPPED | OUTPATIENT
Start: 2024-03-19 | End: 2024-03-26

## 2024-03-19 RX ORDER — PREDNISONE 20 MG/1
40 TABLET ORAL DAILY
Qty: 10 TABLET | Refills: 0 | Status: SHIPPED | OUTPATIENT
Start: 2024-03-19 | End: 2024-03-24

## 2024-03-19 RX ORDER — BROMPHENIRAMINE MALEATE, PSEUDOEPHEDRINE HYDROCHLORIDE, AND DEXTROMETHORPHAN HYDROBROMIDE 2; 30; 10 MG/5ML; MG/5ML; MG/5ML
5 SYRUP ORAL 4 TIMES DAILY PRN
Qty: 175 ML | Refills: 0 | Status: SHIPPED | OUTPATIENT
Start: 2024-03-19

## 2024-03-19 NOTE — PROGRESS NOTES
"Chief Complaint  Nasal Congestion, Fever, and Cough    Subjective        Kristina Parson presents to [facility]  History of Present Illness  Pt presents with nasal congestion, feeling warm, sinus pressure, left ear pain, and cough x 2 days. Denies SOB, chest pain, n/v/d, sore throat, HA or other. Has not taken anything to treat. No known sick contacts. Pt is not currently breastfeeding.     Reviewed all recent labs and medications.  Fever   Associated symptoms include coughing.   Cough  Associated symptoms include a fever.       Objective   Vital Signs:  /60 (BP Location: Left arm, Patient Position: Sitting, Cuff Size: Adult)   Pulse 68   Temp 97.8 °F (36.6 °C) (Temporal)   Resp 18   Ht 157.5 cm (62\")   Wt 67.1 kg (148 lb)   SpO2 100%   BMI 27.07 kg/m²   Estimated body mass index is 27.07 kg/m² as calculated from the following:    Height as of this encounter: 157.5 cm (62\").    Weight as of this encounter: 67.1 kg (148 lb).               Physical Exam  Vitals reviewed.   Constitutional:       General: She is not in acute distress.  HENT:      Head: Normocephalic.      Right Ear: Tympanic membrane normal.      Left Ear: Tympanic membrane normal. Tympanic membrane is erythematous.      Nose: Nose normal. Congestion present.      Right Sinus: Maxillary sinus tenderness present.      Left Sinus: Maxillary sinus tenderness present.      Mouth/Throat:      Pharynx: Oropharynx is clear. Posterior oropharyngeal erythema present.   Eyes:      General: No scleral icterus.     Extraocular Movements: Extraocular movements intact.      Conjunctiva/sclera: Conjunctivae normal.      Pupils: Pupils are equal, round, and reactive to light.   Cardiovascular:      Rate and Rhythm: Normal rate and regular rhythm.      Pulses: Normal pulses.      Heart sounds: Normal heart sounds.   Pulmonary:      Effort: Pulmonary effort is normal.      Breath sounds: Normal breath sounds.   Abdominal:      " General: Bowel sounds are normal.      Palpations: Abdomen is soft.   Musculoskeletal:         General: Normal range of motion.      Cervical back: Neck supple.   Skin:     General: Skin is warm and dry.   Neurological:      Mental Status: She is alert and oriented to person, place, and time.   Psychiatric:         Mood and Affect: Mood normal.         Behavior: Behavior normal.         Thought Content: Thought content normal.         Judgment: Judgment normal.        Result Review :      Common labs          7/12/2023    11:22 10/17/2023    06:28   Common Labs   Glucose 92     BUN 5     Creatinine 0.57     Sodium 136     Potassium 3.7     Chloride 103     Calcium 9.2     Albumin 3.5     Total Bilirubin <0.2     Alkaline Phosphatase 69     AST (SGOT) 17     ALT (SGPT) 12     WBC 12.43  13.87    Hemoglobin 11.5  13.1    Hematocrit 34.1  40.1    Platelets 172  162      Data reviewed : Consultant notes ob/gyn, cardiology              Assessment and Plan     Diagnoses and all orders for this visit:    1. Generalized body aches (Primary)  Comments:  tyl/ibu UAD prn  Orders:  -     POCT SARS-CoV-2 Antigen MIKE + Flu    2. Acute cough  Comments:  bromfed UAD prn  Orders:  -     POCT SARS-CoV-2 Antigen MIKE + Flu    3. POLI (generalized anxiety disorder)  Assessment & Plan:  Psychological condition is unchanged.  Regular aerobic exercise.  Psychological condition  will be reassessed at the next regular appointment.       4. Overweight (BMI 25.0-29.9)  Assessment & Plan:  Patient's (Body mass index is 27.07 kg/m².) indicates that they are overweight with health conditions that include none . Weight is improving with lifestyle modifications. BMI is is above average; BMI management plan is completed. We discussed portion control and increasing exercise.       5. Acute non-recurrent maxillary sinusitis  Comments:  augmentin 875mg PO bid x 7 days   prednisone 40mg PO qd x 5 days   increase rest/clear fluids    Other orders  -      amoxicillin-clavulanate (AUGMENTIN) 875-125 MG per tablet; Take 1 tablet by mouth 2 (Two) Times a Day for 7 days.  Dispense: 14 tablet; Refill: 0  -     predniSONE (DELTASONE) 20 MG tablet; Take 2 tablets by mouth Daily for 5 days.  Dispense: 10 tablet; Refill: 0  -     brompheniramine-pseudoephedrine-DM 30-2-10 MG/5ML syrup; Take 5 mL by mouth 4 (Four) Times a Day As Needed for Cough.  Dispense: 175 mL; Refill: 0             Follow Up     Return if symptoms worsen or fail to improve.  Patient was given instructions and counseling regarding her condition or for health maintenance advice. Please see specific information pulled into the AVS if appropriate.

## 2024-03-19 NOTE — ASSESSMENT & PLAN NOTE
Patient's (Body mass index is 27.07 kg/m².) indicates that they are overweight with health conditions that include none . Weight is improving with lifestyle modifications. BMI is is above average; BMI management plan is completed. We discussed portion control and increasing exercise.   
Psychological condition is unchanged.  Regular aerobic exercise.  Psychological condition  will be reassessed at the next regular appointment.   
children

## 2024-05-15 RX ORDER — PAROXETINE HYDROCHLORIDE 20 MG/1
20 TABLET, FILM COATED ORAL EVERY MORNING
Qty: 30 TABLET | Refills: 4 | OUTPATIENT
Start: 2024-05-15

## 2024-05-16 ENCOUNTER — TELEPHONE (OUTPATIENT)
Dept: FAMILY MEDICINE CLINIC | Facility: CLINIC | Age: 24
End: 2024-05-16
Payer: COMMERCIAL

## 2024-05-16 NOTE — TELEPHONE ENCOUNTER
Caller: Kristina Parson    Relationship: Self    Best call back number: [phone]    Requested Prescriptions:   Requested Prescriptions      No prescriptions requested or ordered in this encounter      PAXIL       Pharmacy where request should be sent: [place], [address] [place], [place] - [facility] [address] - [phone]  - [phone] FX     Last office visit with prescribing clinician: 9/28/2023   Last telemedicine visit with prescribing clinician: Visit date not found   Next office visit with prescribing clinician: Visit date not found     Additional details provided by patient:     THE PATIENT SAID SHE WAS  NO LONGER TAKING THIS MEDICATION BUT STARTED TAKING IT AGAIN DUE TO HER HAVING ANXIETY. SHE IS WANTING TO KNOW IF PCP ISAAC WILL  SEND IT TO THE PHARMACY      Would you like a call back once the refill request has been completed: [] Yes [x] No    If the office needs to give you a call back, can they leave a voicemail: [] Yes [x] No    Parish [place] Rep   05/16/24 12:04 EDT

## 2024-05-20 RX ORDER — PAROXETINE 10 MG/1
10 TABLET, FILM COATED ORAL EVERY MORNING
Qty: 90 TABLET | Refills: 1 | Status: SHIPPED | OUTPATIENT
Start: 2024-05-20

## 2024-06-21 NOTE — PROGRESS NOTES
"CHUCKIE Whittaker  Obstetric Progress Note    Subjective     Patient:    The patient feels comfortable with epidural.      Objective     Vital Signs Range for the last 24 hours          BP: (123-131)/(71-82) 131/82   Heart Rate:  [67-76] 76   Resp:  [16] 16               Weight:  [79.4 kg (175 lb)] 79.4 kg (175 lb)       Flowsheet Rows      Flowsheet Row First Filed Value   Admission Height 157.5 cm (62\") Documented at 10/17/2023 0610   Admission Weight 79.4 kg (175 lb) Documented at 10/17/2023 0610            Intake/Output last 24 hours:    No intake or output data in the 24 hours ending 10/17/23 1356    Intake/Output this shift:    No intake/output data recorded.    Physical Exam:  General: Patient is in no acute distress   Heart CVS exam: normal rate, regular rhythm, normal S1, S2, no murmurs, rubs, clicks or gallops.   Lungs Chest: clear to auscultation, no wheezes, rales or rhonchi, symmetric air entry.     Abdomen Abdominal exam: soft, nontender, nondistended, no masses or organomegaly.   Extremities Exam of extremities: peripheral pulses normal, no pedal edema, no clubbing or cyanosis     Presentation: Vertex   Cervix: Exam by: Method: sterile exam per physician   Dilation: Cervical Dilation (cm): 4   Effacement: Cervical Effacement: 50%   Station:           Fetal Heart Rate Assessment   Method: Fetal HR Assessment Method: external   Beats/min: Fetal HR (beats/min): 130   Baseline: Fetal HR Baseline: normal range   Variability: Fetal HR Variability: marked (amplitude greater than 25 bpm)   Accels: Fetal HR Accelerations: greater than/equal to 15 bpm, lasting at least 15 seconds   Decels: Fetal HR Decelerations: late, variable, recurrent   Tracing Category: Fetal HR Tracing Category: Category II     Uterine Assessment   Method: Method: palpation, per patient report, IUPC (intrauterine pressure catheter)   Frequency (min): Contraction Frequency (Minutes): 2-3   Ctx Count in 10 min:     Duration:     Intensity: " Contraction Intensity: strong by palpation   Intensity by IUPC:     Resting Tone: Uterine Resting Tone: soft by palpation   Resting Tone by IUPC: Uterine Resting Tone (mmHg) by IUPC: 25   Caroline Units:         Assessment & Plan       Pregnancy with 39 completed weeks gestation        Assessment:  1.  Intrauterine pregnancy at 39w1d gestation with reactive fetal status.  Has recovered from episode of decreased heart rate and hyperstimulation   2.  Regular uterine contractions.  3.  Comfortable after epidural  4.  GBS status: Negative    Plan:  1. Vaginal anticipated  2. Plan of care has been reviewed with patient and support persons at bedside  3.  Risks, benefits of treatment plan have been discussed.  4.  All questions have been answered.  5.  Continue current management      Cathleen Comer MD  10/17/2023  13:56 EDT     [phone]

## 2024-07-02 ENCOUNTER — OFFICE VISIT (OUTPATIENT)
Dept: FAMILY MEDICINE CLINIC | Facility: CLINIC | Age: 24
End: 2024-07-02
Payer: COMMERCIAL

## 2024-07-02 VITALS
RESPIRATION RATE: 16 BRPM | HEIGHT: 62 IN | TEMPERATURE: 98.6 F | BODY MASS INDEX: 25.65 KG/M2 | SYSTOLIC BLOOD PRESSURE: 105 MMHG | WEIGHT: 139.4 LBS | HEART RATE: 115 BPM | DIASTOLIC BLOOD PRESSURE: 68 MMHG | OXYGEN SATURATION: 99 %

## 2024-07-02 DIAGNOSIS — U07.1 COVID-19 VIRUS DETECTED: Primary | ICD-10-CM

## 2024-07-02 LAB
EXPIRATION DATE: ABNORMAL
FLUAV AG UPPER RESP QL IA.RAPID: NOT DETECTED
FLUBV AG UPPER RESP QL IA.RAPID: NOT DETECTED
INTERNAL CONTROL: ABNORMAL
Lab: ABNORMAL
SARS-COV-2 AG UPPER RESP QL IA.RAPID: DETECTED

## 2024-07-02 PROCEDURE — 87428 SARSCOV & INF VIR A&B AG IA: CPT | Performed by: FAMILY MEDICINE

## 2024-07-02 PROCEDURE — 99213 OFFICE O/P EST LOW 20 MIN: CPT | Performed by: FAMILY MEDICINE

## 2024-07-02 PROCEDURE — 1125F AMNT PAIN NOTED PAIN PRSNT: CPT | Performed by: FAMILY MEDICINE

## 2024-07-02 RX ORDER — MEDROXYPROGESTERONE ACETATE 150 MG/ML
INJECTION, SUSPENSION INTRAMUSCULAR
COMMUNITY
Start: 2024-03-15

## 2024-07-02 RX ORDER — METOPROLOL SUCCINATE 25 MG/1
TABLET, EXTENDED RELEASE ORAL
COMMUNITY
Start: 2024-06-29

## 2024-07-15 NOTE — PROGRESS NOTES
"Chief Complaint  Fever, Cough (Since yesterday), and Nasal Congestion    Subjective          Kristina Parson presents to [facility]  Fever   Associated symptoms include coughing.   Cough  Associated symptoms include a fever.       Patient presents not feeling well acute fever cough nasal congestion malaise      Objective   Vital Signs:   /68 (BP Location: Left arm, Patient Position: Sitting, Cuff Size: Adult)   Pulse 115   Temp 98.6 °F (37 °C)   Resp 16   Ht 157.5 cm (62\")   Wt 63.2 kg (139 lb 6.4 oz)   SpO2 99%   BMI 25.50 kg/m²            Physical Exam  Vitals reviewed.   Constitutional:       Appearance: Normal appearance. She is well-developed.   HENT:      Head: Normocephalic and atraumatic.      Right Ear: External ear normal.      Left Ear: External ear normal.      Nose: Congestion and rhinorrhea present.   Eyes:      Conjunctiva/sclera: Conjunctivae normal.      Pupils: Pupils are equal, round, and reactive to light.   Cardiovascular:      Rate and Rhythm: Normal rate.   Pulmonary:      Effort: Pulmonary effort is normal.      Breath sounds: Normal breath sounds.   Abdominal:      General: There is no distension.   Skin:     General: Skin is warm and dry.   Neurological:      Mental Status: She is alert and oriented to person, place, and time. Mental status is at baseline.   Psychiatric:         Mood and Affect: Mood and affect normal.         Behavior: Behavior normal.         Thought Content: Thought content normal.         Judgment: Judgment normal.          Result Review :   The following data was reviewed by: Nils Myers DO on 07/02/2024:  Common labs          10/17/2023    06:28   Common Labs   WBC 13.87    Hemoglobin 13.1    Hematocrit 40.1    Platelets 162                    Assessment and Plan    Diagnoses and all orders for this visit:    1. COVID-19 virus detected (Primary)  -     POCT SARS-CoV-2 Antigen MIKE + Flu      Testing positive for COVID " recommend over-the-counter medicines for symptom relief rest fluids follow-up no improvement precautions given        Follow Up   Return if symptoms worsen or fail to improve, for Next scheduled follow up, Recheck.  Patient was given instructions and counseling regarding her condition or for health maintenance advice. Please see specific information pulled into the AVS if appropriate.     Transcribed from ambient dictation for Nils Myers DO by Nils Myers DO.  07/15/24   09:44 EDT

## 2024-09-25 DIAGNOSIS — B37.31 VAGINAL CANDIDA: Primary | ICD-10-CM

## 2024-09-25 PROCEDURE — 87086 URINE CULTURE/COLONY COUNT: CPT | Performed by: NURSE PRACTITIONER

## 2024-09-25 PROCEDURE — 87480 CANDIDA DNA DIR PROBE: CPT | Performed by: NURSE PRACTITIONER

## 2024-09-25 PROCEDURE — 87660 TRICHOMONAS VAGIN DIR PROBE: CPT | Performed by: NURSE PRACTITIONER

## 2024-09-25 PROCEDURE — 87491 CHLMYD TRACH DNA AMP PROBE: CPT | Performed by: NURSE PRACTITIONER

## 2024-09-25 PROCEDURE — 87510 GARDNER VAG DNA DIR PROBE: CPT | Performed by: NURSE PRACTITIONER

## 2024-09-25 PROCEDURE — 87591 N.GONORRHOEAE DNA AMP PROB: CPT | Performed by: NURSE PRACTITIONER

## 2024-09-25 RX ORDER — FLUCONAZOLE 150 MG/1
150 TABLET ORAL ONCE
Qty: 1 TABLET | Refills: 0 | Status: SHIPPED | OUTPATIENT
Start: 2024-09-25 | End: 2024-09-25

## 2024-10-07 RX ORDER — METOPROLOL SUCCINATE 25 MG/1
25 TABLET, EXTENDED RELEASE ORAL DAILY
Qty: 30 TABLET | Refills: 3 | Status: SHIPPED | OUTPATIENT
Start: 2024-10-07

## 2024-10-21 ENCOUNTER — TRANSCRIBE ORDERS (OUTPATIENT)
Dept: LAB | Facility: HOSPITAL | Age: 24
End: 2024-10-21
Payer: COMMERCIAL

## 2024-10-21 ENCOUNTER — LAB (OUTPATIENT)
Dept: LAB | Facility: HOSPITAL | Age: 24
End: 2024-10-21
Payer: COMMERCIAL

## 2024-10-21 DIAGNOSIS — N92.6 IRREGULAR MENSTRUATION: ICD-10-CM

## 2024-10-21 DIAGNOSIS — N92.6 IRREGULAR MENSTRUATION: Primary | ICD-10-CM

## 2024-10-21 LAB — HCG INTACT+B SERPL-ACNC: <1 MIU/ML

## 2024-10-21 PROCEDURE — 36415 COLL VENOUS BLD VENIPUNCTURE: CPT

## 2024-10-21 PROCEDURE — 84702 CHORIONIC GONADOTROPIN TEST: CPT

## 2024-11-14 ENCOUNTER — OFFICE VISIT (OUTPATIENT)
Dept: FAMILY MEDICINE CLINIC | Facility: CLINIC | Age: 24
End: 2024-11-14
Payer: COMMERCIAL

## 2024-11-14 VITALS
HEART RATE: 74 BPM | SYSTOLIC BLOOD PRESSURE: 112 MMHG | HEIGHT: 62 IN | TEMPERATURE: 98 F | WEIGHT: 131.6 LBS | RESPIRATION RATE: 16 BRPM | BODY MASS INDEX: 24.22 KG/M2 | DIASTOLIC BLOOD PRESSURE: 65 MMHG | OXYGEN SATURATION: 100 %

## 2024-11-14 DIAGNOSIS — N30.01 ACUTE CYSTITIS WITH HEMATURIA: ICD-10-CM

## 2024-11-14 DIAGNOSIS — F41.1 GAD (GENERALIZED ANXIETY DISORDER): Chronic | ICD-10-CM

## 2024-11-14 DIAGNOSIS — Z00.00 PHYSICAL EXAM, ANNUAL: ICD-10-CM

## 2024-11-14 DIAGNOSIS — J03.90 TONSILLITIS: ICD-10-CM

## 2024-11-14 DIAGNOSIS — R00.0 TACHYCARDIA: Chronic | ICD-10-CM

## 2024-11-14 DIAGNOSIS — R35.0 URINE FREQUENCY: Primary | ICD-10-CM

## 2024-11-14 DIAGNOSIS — Z79.899 MEDICATION MANAGEMENT: ICD-10-CM

## 2024-11-14 LAB
BILIRUB BLD-MCNC: NEGATIVE MG/DL
CLARITY, POC: CLEAR
COLOR UR: YELLOW
EXPIRATION DATE: ABNORMAL
GLUCOSE UR STRIP-MCNC: NEGATIVE MG/DL
KETONES UR QL: NEGATIVE
LEUKOCYTE EST, POC: ABNORMAL
Lab: ABNORMAL
NITRITE UR-MCNC: NEGATIVE MG/ML
PH UR: 7 [PH] (ref 5–8)
PROT UR STRIP-MCNC: ABNORMAL MG/DL
RBC # UR STRIP: ABNORMAL /UL
SP GR UR: 1.02 (ref 1–1.03)
UROBILINOGEN UR QL: ABNORMAL

## 2024-11-14 PROCEDURE — 87086 URINE CULTURE/COLONY COUNT: CPT | Performed by: FAMILY MEDICINE

## 2024-11-14 PROCEDURE — 81003 URINALYSIS AUTO W/O SCOPE: CPT | Performed by: FAMILY MEDICINE

## 2024-11-14 PROCEDURE — 1159F MED LIST DOCD IN RCRD: CPT | Performed by: FAMILY MEDICINE

## 2024-11-14 PROCEDURE — 1160F RVW MEDS BY RX/DR IN RCRD: CPT | Performed by: FAMILY MEDICINE

## 2024-11-14 PROCEDURE — 99395 PREV VISIT EST AGE 18-39: CPT | Performed by: FAMILY MEDICINE

## 2024-11-14 PROCEDURE — 1125F AMNT PAIN NOTED PAIN PRSNT: CPT | Performed by: FAMILY MEDICINE

## 2024-11-14 PROCEDURE — 2014F MENTAL STATUS ASSESS: CPT | Performed by: FAMILY MEDICINE

## 2024-11-14 RX ORDER — METOPROLOL SUCCINATE 25 MG/1
25 TABLET, EXTENDED RELEASE ORAL DAILY
Qty: 30 TABLET | Refills: 3 | Status: SHIPPED | OUTPATIENT
Start: 2024-11-14

## 2024-11-14 RX ORDER — PAROXETINE 10 MG/1
10 TABLET, FILM COATED ORAL EVERY MORNING
Qty: 90 TABLET | Refills: 1 | Status: SHIPPED | OUTPATIENT
Start: 2024-11-14

## 2024-11-14 RX ORDER — AMOXICILLIN 500 MG/1
500 CAPSULE ORAL 3 TIMES DAILY
Qty: 21 CAPSULE | Refills: 0 | Status: SHIPPED | OUTPATIENT
Start: 2024-11-14

## 2024-11-14 RX ORDER — METRONIDAZOLE 500 MG/1
TABLET ORAL
COMMUNITY
Start: 2024-07-26

## 2024-11-14 RX ORDER — FLUCONAZOLE 150 MG/1
TABLET ORAL
COMMUNITY
Start: 2024-09-25

## 2024-11-14 NOTE — PROGRESS NOTES
"Chief Complaint  Urinary Frequency (Pt C/O of vaginal itchiness and  discharge. Painful urination and frequency. X4 days) and Annual Exam    Subjective          Kristina Parson presents to [facility]  Urinary Frequency   Associated symptoms include frequency.       Patient presents annual exam as well as complaint of painful urination and frequency discharged.  She had been on Flagyl and Diflucan in the past for similar issues on metoprolol and Paxil currently for issues with heart rate anxiety      Objective   Vital Signs:   /65 (BP Location: Left arm, Patient Position: Sitting, Cuff Size: Adult)   Pulse 74   Temp 98 °F (36.7 °C)   Resp 16   Ht 157.5 cm (62\")   Wt 59.7 kg (131 lb 9.6 oz)   SpO2 100%   BMI 24.07 kg/m²     BMI is within normal parameters. No other follow-up for BMI required.      Physical Exam  Vitals reviewed.   Constitutional:       Appearance: Normal appearance. She is well-developed.   HENT:      Head: Normocephalic and atraumatic.      Right Ear: External ear normal.      Left Ear: External ear normal.      Nose: Nose normal.   Eyes:      Conjunctiva/sclera: Conjunctivae normal.      Pupils: Pupils are equal, round, and reactive to light.   Cardiovascular:      Rate and Rhythm: Normal rate.   Pulmonary:      Effort: Pulmonary effort is normal.      Breath sounds: Normal breath sounds.   Abdominal:      General: There is no distension.   Skin:     General: Skin is warm and dry.   Neurological:      Mental Status: She is alert and oriented to person, place, and time. Mental status is at baseline.   Psychiatric:         Mood and Affect: Mood and affect normal.         Behavior: Behavior normal.         Thought Content: Thought content normal.         Judgment: Judgment normal.          Result Review :   The following data was reviewed by: Nils Myers DO on 11/14/2024:                  Assessment and Plan    Diagnoses and all orders for this visit:    1. " Urine frequency (Primary)  -     POC Urinalysis Dipstick, Automated    2. Acute cystitis with hematuria  -     Urine Culture - Urine, Urine, Clean Catch; Future    3. POLI (generalized anxiety disorder)  -     PARoxetine (PAXIL) 10 MG tablet; Take 1 tablet by mouth Every Morning.  Dispense: 90 tablet; Refill: 1    4. Medication management  -     Cancel: POC Medline 12 Panel Urine Drug Screen    5. Tachycardia  -     metoprolol succinate XL (TOPROL-XL) 25 MG 24 hr tablet; Take 1 tablet by mouth Daily.  Dispense: 30 tablet; Refill: 3    6. Physical exam, annual        Reviewed health status and screened for age appropriate conditions, will order labs today to manage, screen and follow up at least yearly    Recommended and reviewed age appropriate immunizations cancer screenings today     Recommend wearing sunscreen and following up on any concerning skin conditions or lesions, wearing seat belts and other risk reduction measures to lessen incident of death, disease or other     Counseled on risks, disease and advice given on screening and continued   management of health and condition through the next year until next physical     Refill medicines for POLI, HR stable continue metoprolol    Tx uti with antibiotics, review culture and change if appropriate rx diflucan if needed        Follow Up   Return if symptoms worsen or fail to improve, for Next scheduled follow up, Recheck yearly for physical.  Patient was given instructions and counseling regarding her condition or for health maintenance advice. Please see specific information pulled into the AVS if appropriate.     Transcribed from ambient dictation for Nils Myers DO by Nils Myers DO.  11/14/24   10:59 EST

## 2024-11-15 LAB — BACTERIA SPEC AEROBE CULT: NO GROWTH

## 2025-02-12 ENCOUNTER — OFFICE VISIT (OUTPATIENT)
Dept: FAMILY MEDICINE CLINIC | Facility: CLINIC | Age: 25
End: 2025-02-12
Payer: COMMERCIAL

## 2025-02-12 VITALS
OXYGEN SATURATION: 100 % | HEART RATE: 116 BPM | WEIGHT: 126.6 LBS | BODY MASS INDEX: 23.3 KG/M2 | TEMPERATURE: 98.5 F | DIASTOLIC BLOOD PRESSURE: 65 MMHG | SYSTOLIC BLOOD PRESSURE: 112 MMHG | HEIGHT: 62 IN | RESPIRATION RATE: 17 BRPM

## 2025-02-12 DIAGNOSIS — A08.4 VIRAL GASTROENTERITIS: Primary | ICD-10-CM

## 2025-02-12 PROCEDURE — 99213 OFFICE O/P EST LOW 20 MIN: CPT

## 2025-02-12 PROCEDURE — 1126F AMNT PAIN NOTED NONE PRSNT: CPT

## 2025-02-12 RX ORDER — ONDANSETRON 4 MG/1
4 TABLET, ORALLY DISINTEGRATING ORAL EVERY 4 HOURS PRN
Qty: 15 TABLET | Refills: 0 | Status: SHIPPED | OUTPATIENT
Start: 2025-02-12

## 2025-02-12 RX ORDER — ONDANSETRON 8 MG/1
8 TABLET, FILM COATED ORAL EVERY 8 HOURS PRN
COMMUNITY
Start: 2024-12-03 | End: 2025-02-12

## 2025-02-12 NOTE — PROGRESS NOTES
"Chief Complaint     Abdominal Pain (Pt reports all symptoms started this morning. ), Nausea, Vomiting (Reports vomiting 3 times this morning. Pt states that she has not attempted to eat anything d/t the severity of her nausea. Reports emesis is \"yellow\" in color. Denies possibility of pregnancy.), and Diarrhea (X1 this morning. Pt reports normal in color.)    History of Present Illness     Kristina Parson is a 24 y.o. female who presents to [facility] for evaluation of abdominal pain.      She has had abdominal pain, nausea, vomiting, and diarrhea. She has vomited but it is basically all yellow stomach acid. She has not eaten anything so it is just acid. She denies possibility of pregnancy.      History      Past Medical History:   Diagnosis Date    Anxiety     Closed displaced fracture of fifth metatarsal bone 09/26/2018    Femur fracture     POLI (generalized anxiety disorder)     Hand injury     Right    Hand pain, right     Migraine     Nausea 07/11/2021    Otalgia     Otitis media        Past Surgical History:   Procedure Laterality Date    FEMUR FRACTURE SURGERY  2014    WISDOM TOOTH EXTRACTION  2020       Family History   Problem Relation Age of Onset    No Known Problems Mother     No Known Problems Father     Stroke Paternal Grandmother     Diabetes Paternal Grandmother         Unspecified    Stroke Paternal Grandfather     Diabetes Paternal Grandfather         Unspecified        Current Medications        Current Outpatient Medications:     metoprolol succinate XL (TOPROL-XL) 25 MG 24 hr tablet, Take 1 tablet by mouth Daily., Disp: 30 tablet, Rfl: 3    PARoxetine (PAXIL) 10 MG tablet, Take 1 tablet by mouth Every Morning., Disp: 90 tablet, Rfl: 1    ondansetron ODT (ZOFRAN-ODT) 4 MG disintegrating tablet, Place 1 tablet on the tongue Every 4 (Four) Hours As Needed for Nausea or Vomiting., Disp: 15 tablet, Rfl: 0     Allergies     Allergies   Allergen Reactions    Wellbutrin " "[Bupropion] Palpitations       Social History       Social History     Social History Narrative    Not on file       Immunizations     Immunization:  Immunization History   Administered Date(s) Administered    DTaP, Unspecified 2002, 2002, 2002, 12/22/2003, 08/11/2006    HPV Quadrivalent 07/19/2013, 04/26/2014, 02/10/2015    Hep A, 2 Dose 04/26/2014, 02/10/2015, 08/22/2018    Hep B, Adolescent or Pediatric 2002, 2002, 2002    Hib (PRP-OMP) 2002    IPV 2002, 2002, 2002, 2002, 08/11/2006    Influenza TIV (IM) 04/26/2014, 11/23/2015    Influenza, Unspecified 11/07/2019, 12/08/2020    MCV4 Unspecified 07/19/2013    MMR 08/20/2003, 08/11/2006    Meningococcal MCV4P (Menactra) 07/19/2013    Tdap 07/19/2013    Varicella 08/20/2003, 04/18/2013    flucelvax quad pfs =>4 YRS 11/07/2019, 12/08/2020          Objective     Objective     Vital Signs:   /65 (BP Location: Left arm, Patient Position: Sitting, Cuff Size: Adult)   Pulse 116   Temp 98.5 °F (36.9 °C) (Oral)   Resp 17   Ht 157.5 cm (62.01\")   Wt 57.4 kg (126 lb 9.6 oz)   SpO2 100%   BMI 23.15 kg/m²       Physical Exam  Constitutional:       Appearance: Normal appearance.   HENT:      Nose: Nose normal.      Mouth/Throat:      Mouth: Mucous membranes are moist.   Cardiovascular:      Rate and Rhythm: Normal rate and regular rhythm.      Pulses: Normal pulses.      Heart sounds: Normal heart sounds.   Pulmonary:      Effort: Pulmonary effort is normal.      Breath sounds: Normal breath sounds.   Skin:     General: Skin is warm and dry.   Neurological:      General: No focal deficit present.      Mental Status: She is alert and oriented to person, place, and time.   Psychiatric:         Mood and Affect: Mood normal.         Behavior: Behavior normal.         Results    The following data was reviewed by: KAYLAN Low on 02/12/2025:             Assessment and Plan        Assessment and " Plan    Diagnoses and all orders for this visit:    1. Viral gastroenteritis (Primary)  -     ondansetron ODT (ZOFRAN-ODT) 4 MG disintegrating tablet; Place 1 tablet on the tongue Every 4 (Four) Hours As Needed for Nausea or Vomiting.  Dispense: 15 tablet; Refill: 0              Follow Up        Follow Up   Return if symptoms worsen or fail to improve.  Patient was given instructions and counseling regarding her condition or for health maintenance advice. Please see specific information pulled into the AVS if appropriate.

## 2025-03-18 ENCOUNTER — OFFICE VISIT (OUTPATIENT)
Dept: FAMILY MEDICINE CLINIC | Facility: CLINIC | Age: 25
End: 2025-03-18
Payer: COMMERCIAL

## 2025-03-18 VITALS
WEIGHT: 132 LBS | RESPIRATION RATE: 18 BRPM | BODY MASS INDEX: 24.29 KG/M2 | SYSTOLIC BLOOD PRESSURE: 106 MMHG | DIASTOLIC BLOOD PRESSURE: 70 MMHG | HEART RATE: 80 BPM | TEMPERATURE: 97.6 F | OXYGEN SATURATION: 98 % | HEIGHT: 62 IN

## 2025-03-18 DIAGNOSIS — R50.9 FEVER, UNSPECIFIED FEVER CAUSE: ICD-10-CM

## 2025-03-18 DIAGNOSIS — R00.0 TACHYCARDIA: ICD-10-CM

## 2025-03-18 DIAGNOSIS — J02.9 SORE THROAT: ICD-10-CM

## 2025-03-18 DIAGNOSIS — J10.1 INFLUENZA A: Primary | ICD-10-CM

## 2025-03-18 LAB
EXPIRATION DATE: ABNORMAL
EXPIRATION DATE: NORMAL
FLUAV AG UPPER RESP QL IA.RAPID: DETECTED
FLUBV AG UPPER RESP QL IA.RAPID: NOT DETECTED
INTERNAL CONTROL: ABNORMAL
INTERNAL CONTROL: NORMAL
Lab: ABNORMAL
Lab: NORMAL
S PYO AG THROAT QL: NEGATIVE
SARS-COV-2 AG UPPER RESP QL IA.RAPID: NOT DETECTED

## 2025-03-18 RX ORDER — OSELTAMIVIR PHOSPHATE 75 MG/1
75 CAPSULE ORAL 2 TIMES DAILY
Qty: 10 CAPSULE | Refills: 0 | Status: SHIPPED | OUTPATIENT
Start: 2025-03-18

## 2025-03-18 RX ORDER — GUAIFENESIN 600 MG/1
1200 TABLET, EXTENDED RELEASE ORAL 2 TIMES DAILY
Qty: 28 TABLET | Refills: 0 | Status: SHIPPED | OUTPATIENT
Start: 2025-03-18

## 2025-03-18 RX ORDER — GINSENG 100 MG
1 CAPSULE ORAL DAILY
Qty: 7 TABLET | Refills: 0 | Status: SHIPPED | OUTPATIENT
Start: 2025-03-18 | End: 2025-03-25

## 2025-03-18 RX ORDER — DEXTROMETHORPHAN HYDROBROMIDE AND PROMETHAZINE HYDROCHLORIDE 15; 6.25 MG/5ML; MG/5ML
5 SYRUP ORAL 4 TIMES DAILY PRN
Qty: 180 ML | Refills: 0 | Status: SHIPPED | OUTPATIENT
Start: 2025-03-18

## 2025-03-18 NOTE — PROGRESS NOTES
"Chief Complaint  Sore Throat, Cough, and Fever (X1 day)    Subjective        Kristina Parson presents to [facility]  History of Present Illness  She has been having a cough for the past 24 hours. She is having fever and chills and a non-productive cough.       Objective   Vital Signs:  /70 (BP Location: Left arm, Patient Position: Sitting, Cuff Size: Adult)   Pulse 80   Temp 97.6 °F (36.4 °C) (Temporal)   Resp 18   Ht 157.5 cm (62.01\")   Wt 59.9 kg (132 lb)   SpO2 98%   BMI 24.14 kg/m²   Estimated body mass index is 24.14 kg/m² as calculated from the following:    Height as of this encounter: 157.5 cm (62.01\").    Weight as of this encounter: 59.9 kg (132 lb).    BMI is within normal parameters. No other follow-up for BMI required.      Physical Exam  Vitals reviewed.   Constitutional:       Appearance: She is well-developed. She is diaphoretic.   HENT:      Head: Normocephalic and atraumatic.      Right Ear: External ear normal.      Left Ear: External ear normal.      Mouth/Throat:      Pharynx: No oropharyngeal exudate.   Eyes:      Conjunctiva/sclera: Conjunctivae normal.      Pupils: Pupils are equal, round, and reactive to light.   Neck:      Vascular: No carotid bruit.   Cardiovascular:      Rate and Rhythm: Normal rate and regular rhythm.      Heart sounds: No murmur heard.     No friction rub. No gallop.   Pulmonary:      Effort: Pulmonary effort is normal.      Breath sounds: Normal breath sounds. No wheezing or rhonchi.   Abdominal:      General: There is no distension.   Skin:     General: Skin is warm and dry.   Neurological:      Mental Status: She is alert and oriented to person, place, and time.      Cranial Nerves: No cranial nerve deficit.      Motor: No weakness.   Psychiatric:         Mood and Affect: Mood and affect normal.         Behavior: Behavior normal.         Thought Content: Thought content normal.         Judgment: Judgment normal.      "   Result Review :                      Assessment and Plan   Diagnoses and all orders for this visit:    1. Influenza A (Primary)  Comments:  The patient was given prescriptions for Tylenol, Tamiflu, vitamin D, zinc and cough medicine all to be taken as directed.  Told if symptoms worsen to call back.  Orders:  -     promethazine-dextromethorphan (PROMETHAZINE-DM) 6.25-15 MG/5ML syrup; Take 5 mL by mouth 4 (Four) Times a Day As Needed for Cough.  Dispense: 180 mL; Refill: 0  -     oseltamivir (Tamiflu) 75 MG capsule; Take 1 capsule by mouth 2 (Two) Times a Day.  Dispense: 10 capsule; Refill: 0  -     guaiFENesin (Mucinex) 600 MG 12 hr tablet; Take 2 tablets by mouth 2 (Two) Times a Day.  Dispense: 28 tablet; Refill: 0  -     Zinc 50 MG tablet; Take 1 tablet by mouth Daily for 7 days.  Dispense: 7 tablet; Refill: 0  -     Cholecalciferol (Camryn-Rul Vitamin D) 125 MCG (5000 UT) tablet; Take 1 tablet by mouth Daily for 7 days.  Dispense: 7 tablet; Refill: 0  -     Acetaminophen (Tylenol) 325 MG capsule; Take 1 capsule by mouth 4 (Four) Times a Day As Needed (fever, chills and body aches) for up to 7 days.  Dispense: 100 capsule; Refill: 0    2. Tachycardia  Assessment & Plan:  The patient's heartbeat is in normal range today.  Given the fact she has influenza this is a good sign that the metoprolol is controlling her tachycardia.      3. Sore throat  Comments:  The patient sore throat most likely is due to flu A.  Negative for strep.  Orders:  -     POCT rapid strep A    4. Fever, unspecified fever cause  -     POCT SARS-CoV-2 Antigen MIKE + Flu             Follow Up   No follow-ups on file.  Patient was given instructions and counseling regarding her condition or for health maintenance advice. Please see specific information pulled into the AVS if appropriate.

## 2025-03-18 NOTE — ASSESSMENT & PLAN NOTE
The patient's heartbeat is in normal range today.  Given the fact she has influenza this is a good sign that the metoprolol is controlling her tachycardia.

## 2025-05-02 ENCOUNTER — OFFICE VISIT (OUTPATIENT)
Dept: FAMILY MEDICINE CLINIC | Facility: CLINIC | Age: 25
End: 2025-05-02

## 2025-05-02 VITALS
OXYGEN SATURATION: 100 % | TEMPERATURE: 98 F | DIASTOLIC BLOOD PRESSURE: 68 MMHG | WEIGHT: 139 LBS | HEART RATE: 58 BPM | SYSTOLIC BLOOD PRESSURE: 108 MMHG | BODY MASS INDEX: 25.58 KG/M2 | HEIGHT: 62 IN

## 2025-05-02 DIAGNOSIS — N92.6 IRREGULAR MENSTRUAL CYCLE: Primary | ICD-10-CM

## 2025-05-02 RX ORDER — NORETHINDRONE ACETATE AND ETHINYL ESTRADIOL 1; 5 MG/1; UG/1
1 TABLET ORAL DAILY
Qty: 28 TABLET | Refills: 2 | Status: SHIPPED | OUTPATIENT
Start: 2025-05-02

## 2025-05-02 NOTE — PROGRESS NOTES
Chief Complaint     Vaginal Bleeding (Period that has lasted 17 days, Started April 16th )    Patient or patient representative verbalized consent for the use of Ambient Listening during the visit with  KAYLAN Low for chart documentation. 5/2/2025  14:57 EDT    History of Present Illness     Kristina Parson is a 24 y.o. female who presents to [facility] .    History of Present Illness  The patient presents for evaluation of a prolonged menstrual cycle.    She has been experiencing a menstrual cycle lasting approximately 16 days. This is the first occurrence of such an extended cycle since discontinuing birth control injections 6 months ago. Only 2 or 3 doses of the injection were received within an 18-month period. The patient reports that the bleeding ceases during sleep but resumes with a heavy flow upon awakening and standing. Fatigue due to the continuous bleeding is expressed.     Her last obstetrician, Dr. Randhawa, has recently retired.    GYNECOLOGICAL HISTORY:  - Duration: 16 days  - Frequency and Flow: Heavy         History      Past Medical History:   Diagnosis Date    Anxiety     Closed displaced fracture of fifth metatarsal bone 09/26/2018    Femur fracture     POLI (generalized anxiety disorder)     Hand injury     Right    Hand pain, right     Migraine     Nausea 07/11/2021    Otalgia     Otitis media        Past Surgical History:   Procedure Laterality Date    FEMUR FRACTURE SURGERY  2014    WISDOM TOOTH EXTRACTION  2020       Family History   Problem Relation Age of Onset    No Known Problems Mother     No Known Problems Father     Stroke Paternal Grandmother     Diabetes Paternal Grandmother         Unspecified    Stroke Paternal Grandfather     Diabetes Paternal Grandfather         Unspecified        Current Medications        Current Outpatient Medications:     guaiFENesin (Mucinex) 600 MG 12 hr tablet, Take 2 tablets by mouth 2 (Two) Times a Day., Disp: 28  "tablet, Rfl: 0    metoprolol succinate XL (TOPROL-XL) 25 MG 24 hr tablet, Take 1 tablet by mouth Daily., Disp: 30 tablet, Rfl: 3    norethindrone-ethinyl estradiol (FEMHRT 1/5) 1-5 MG-MCG tablet, Take 1 tablet by mouth Daily., Disp: 28 tablet, Rfl: 2     Allergies     Allergies   Allergen Reactions    Wellbutrin [Bupropion] Palpitations       Social History       Social History     Social History Narrative    Not on file       Immunizations     Immunization:  Immunization History   Administered Date(s) Administered    DTaP, Unspecified 2002, 2002, 2002, 12/22/2003, 08/11/2006    HPV Quadrivalent 07/19/2013, 04/26/2014, 02/10/2015    Hep A, 2 Dose 04/26/2014, 02/10/2015, 08/22/2018    Hep B, Adolescent or Pediatric 2002, 2002, 2002    Hib (PRP-OMP) 2002    IPV 2002, 2002, 2002, 2002, 08/11/2006    Influenza TIV (IM) 04/26/2014, 11/23/2015    Influenza, Unspecified 11/07/2019, 12/08/2020    MCV4 Unspecified 07/19/2013    MMR 08/20/2003, 08/11/2006    Meningococcal MCV4P (Menactra) 07/19/2013    Tdap 07/19/2013    Varicella 08/20/2003, 04/18/2013    flucelvax quad pfs =>4 YRS 11/07/2019, 12/08/2020          Objective     Objective     Vital Signs:   /68 (BP Location: Left arm, Patient Position: Sitting, Cuff Size: Adult)   Pulse 58   Temp 98 °F (36.7 °C) (Temporal)   Ht 157.5 cm (62\")   Wt 63 kg (139 lb)   SpO2 100%   BMI 25.42 kg/m²       Physical Exam  Constitutional:       Appearance: Normal appearance.   HENT:      Nose: Nose normal.      Mouth/Throat:      Mouth: Mucous membranes are moist.   Cardiovascular:      Rate and Rhythm: Normal rate and regular rhythm.      Pulses: Normal pulses.      Heart sounds: Normal heart sounds.   Pulmonary:      Effort: Pulmonary effort is normal.      Breath sounds: Normal breath sounds.   Skin:     General: Skin is warm and dry.   Neurological:      General: No focal deficit present.      Mental " Status: She is alert and oriented to person, place, and time.   Psychiatric:         Mood and Affect: Mood normal.         Behavior: Behavior normal.         Physical Exam        Results    The following data was reviewed by: KAYLAN Low on 05/02/2025:          Results         Assessment and Plan        Assessment and Plan    Diagnoses and all orders for this visit:    1. Irregular menstrual cycle (Primary)  -     norethindrone-ethinyl estradiol (FEMHRT 1/5) 1-5 MG-MCG tablet; Take 1 tablet by mouth Daily.  Dispense: 28 tablet; Refill: 2  -     Ambulatory Referral to Obstetrics / Gynecology        Assessment & Plan  1. Menorrhagia.  - Hormonal balance is still in the process of regulation following the discontinuation of birth control injections 6 months ago.  - Explained that this process could extend up to a year, particularly if the injections were administered for more than a year.  - Discussed the possibility of a uterine fibroid, which could be causing the observed pattern of bleeding cessation during sleep and resumption upon standing.  - Prescription for birth control pills has been provided, with instructions to commence the regimen immediately. Advised to discontinue the pills once her menstrual cycle ceases, should she prefer not to maintain the regimen. Referral to an obstetrician/gynecologist has been made. If the bleeding persists beyond Monday, instructed to contact us. Should the bleeding continue, a transvaginal ultrasound will be ordered.        Follow Up        Follow Up   No follow-ups on file.  Patient was given instructions and counseling regarding her condition or for health maintenance advice. Please see specific information pulled into the AVS if appropriate.

## 2025-05-05 ENCOUNTER — PATIENT MESSAGE (OUTPATIENT)
Dept: FAMILY MEDICINE CLINIC | Facility: CLINIC | Age: 25
End: 2025-05-05
Payer: COMMERCIAL

## 2025-05-05 ENCOUNTER — TELEPHONE (OUTPATIENT)
Dept: FAMILY MEDICINE CLINIC | Facility: CLINIC | Age: 25
End: 2025-05-05
Payer: COMMERCIAL

## 2025-05-05 DIAGNOSIS — N92.1 METRORRHAGIA: Primary | ICD-10-CM

## 2025-05-05 NOTE — TELEPHONE ENCOUNTER
Caller: Kristina Parson    Relationship: Self    Best call back number:   Telephone Information:   Mobile [phone]         What is the best time to reach you: ANYTIME     Who are you requesting to speak with (clinical staff, provider,  specific staff member): CLINICAL          What was the call regarding:      THE PATIENT SAID SHE WAS TOLD BY PCP CHEWINGING TO  CALL BACK TODAY IF SHE WAS NOT BETTER. SHE SAID SHE IS STILL BLEEDING  VAGINALLY  AND IS NEEDING TO KNOW WHAT TO DO.

## 2025-05-14 ENCOUNTER — HOSPITAL ENCOUNTER (OUTPATIENT)
Dept: ULTRASOUND IMAGING | Facility: HOSPITAL | Age: 25
Discharge: HOME | End: 2025-05-14
Payer: COMMERCIAL

## 2025-05-14 DIAGNOSIS — N92.1 METRORRHAGIA: ICD-10-CM

## 2025-05-14 PROCEDURE — 76830 TRANSVAGINAL US NON-OB: CPT

## 2025-06-02 ENCOUNTER — OFFICE VISIT (OUTPATIENT)
Dept: FAMILY MEDICINE CLINIC | Facility: CLINIC | Age: 25
End: 2025-06-02
Payer: COMMERCIAL

## 2025-06-02 VITALS
BODY MASS INDEX: 25.21 KG/M2 | SYSTOLIC BLOOD PRESSURE: 111 MMHG | DIASTOLIC BLOOD PRESSURE: 73 MMHG | OXYGEN SATURATION: 100 % | HEIGHT: 62 IN | TEMPERATURE: 98 F | HEART RATE: 64 BPM | WEIGHT: 137 LBS

## 2025-06-02 DIAGNOSIS — R30.0 DYSURIA: ICD-10-CM

## 2025-06-02 DIAGNOSIS — Z12.4 ENCOUNTER FOR PAPANICOLAOU SMEAR OF CERVIX: Primary | ICD-10-CM

## 2025-06-02 DIAGNOSIS — Z20.2 POSSIBLE EXPOSURE TO STD: ICD-10-CM

## 2025-06-02 LAB
BILIRUB BLD-MCNC: NEGATIVE MG/DL
C TRACH DNA SPEC QL NAA+PROBE: NOT DETECTED
CLARITY, POC: CLEAR
COLOR UR: YELLOW
EXPIRATION DATE: ABNORMAL
GLUCOSE UR STRIP-MCNC: NEGATIVE MG/DL
KETONES UR QL: NEGATIVE
LEUKOCYTE EST, POC: ABNORMAL
Lab: ABNORMAL
N GONORRHOEA RRNA SPEC QL NAA+PROBE: NOT DETECTED
NITRITE UR-MCNC: NEGATIVE MG/ML
PH UR: 5.5 [PH] (ref 5–8)
PROT UR STRIP-MCNC: ABNORMAL MG/DL
RBC # UR STRIP: NEGATIVE /UL
SP GR UR: 1.03 (ref 1–1.03)
UROBILINOGEN UR QL: NORMAL

## 2025-06-02 PROCEDURE — 87591 N.GONORRHOEAE DNA AMP PROB: CPT

## 2025-06-02 PROCEDURE — 87086 URINE CULTURE/COLONY COUNT: CPT

## 2025-06-02 PROCEDURE — 99395 PREV VISIT EST AGE 18-39: CPT

## 2025-06-02 PROCEDURE — 87480 CANDIDA DNA DIR PROBE: CPT

## 2025-06-02 PROCEDURE — 87491 CHLMYD TRACH DNA AMP PROBE: CPT

## 2025-06-02 PROCEDURE — G0123 SCREEN CERV/VAG THIN LAYER: HCPCS

## 2025-06-02 PROCEDURE — 2014F MENTAL STATUS ASSESS: CPT

## 2025-06-02 PROCEDURE — 87510 GARDNER VAG DNA DIR PROBE: CPT

## 2025-06-02 PROCEDURE — 87660 TRICHOMONAS VAGIN DIR PROBE: CPT

## 2025-06-02 PROCEDURE — 1126F AMNT PAIN NOTED NONE PRSNT: CPT

## 2025-06-02 RX ORDER — PAROXETINE 10 MG/1
10 TABLET, FILM COATED ORAL EVERY MORNING
COMMUNITY
Start: 2025-05-09

## 2025-06-02 NOTE — PROGRESS NOTES
Chief Complaint     Gynecologic Exam and Difficulty Urinating (With odor for about a week )    Patient or patient representative verbalized consent for the use of Ambient Listening during the visit with  KAYLAN Low for chart documentation. 6/2/2025  12:25 EDT    History of Present Illness     Kristina Parson is a 25 y.o. female who presents to [facility] .    History of Present Illness  The patient presents for evaluation of abnormal periods and suspected urinary tract infection.    She reports persistent menstrual irregularities, with her most recent cycle starting last week and lasting only 2 days. Additionally, she experienced prolonged bleeding lasting over 27 days in the past, during which an ultrasound was performed at the hospital, yielding no significant findings. She has been in a monogamous relationship for the past 2 months and recalls a similar episode approximately a year ago, for which she received treatment from her OB-GYN. She adhered to the prescribed medication regimen and abstained from sexual activity for the recommended period of 10 days.    She expresses concern about a potential urinary tract infection due to the presence of leukocytes and proteins in her urine, as well as a distinct odor and dysuria.    GYNECOLOGICAL HISTORY:  - Duration: 2 days         History      Past Medical History:   Diagnosis Date    Anxiety     Closed displaced fracture of fifth metatarsal bone 09/26/2018    Femur fracture     POLI (generalized anxiety disorder)     Hand injury     Right    Hand pain, right     Migraine     Nausea 07/11/2021    Otalgia     Otitis media        Past Surgical History:   Procedure Laterality Date    FEMUR FRACTURE SURGERY  2014    WISDOM TOOTH EXTRACTION  2020       Family History   Problem Relation Age of Onset    No Known Problems Mother     No Known Problems Father     Stroke Paternal Grandmother     Diabetes Paternal Grandmother          "Unspecified    Stroke Paternal Grandfather     Diabetes Paternal Grandfather         Unspecified        Current Medications        Current Outpatient Medications:     metoprolol succinate XL (TOPROL-XL) 25 MG 24 hr tablet, Take 1 tablet by mouth Daily., Disp: 30 tablet, Rfl: 3    PARoxetine (PAXIL) 10 MG tablet, Take 1 tablet by mouth Every Morning., Disp: , Rfl:      Allergies     Allergies   Allergen Reactions    Wellbutrin [Bupropion] Palpitations       Social History       Social History     Social History Narrative    Not on file       Immunizations     Immunization:  Immunization History   Administered Date(s) Administered    DTaP, Unspecified 2002, 2002, 2002, 12/22/2003, 08/11/2006    HPV Quadrivalent 07/19/2013, 04/26/2014, 02/10/2015    Hep A, 2 Dose 04/26/2014, 02/10/2015, 08/22/2018    Hep B, Adolescent or Pediatric 2002, 2002, 2002    Hib (PRP-OMP) 2002    IPV 2002, 2002, 2002, 2002, 08/11/2006    Influenza TIV (IM) 04/26/2014, 11/23/2015    Influenza, Unspecified 11/07/2019, 12/08/2020    MCV4 Unspecified 07/19/2013    MMR 08/20/2003, 08/11/2006    Meningococcal MCV4P (Menactra) 07/19/2013    Tdap 07/19/2013    Varicella 08/20/2003, 04/18/2013    flucelvax quad pfs =>4 YRS 11/07/2019, 12/08/2020          Objective     Objective     Vital Signs:   /73 (BP Location: Left arm, Patient Position: Sitting, Cuff Size: Adult)   Pulse 64   Temp 98 °F (36.7 °C) (Temporal)   Ht 157.5 cm (62\")   Wt 62.1 kg (137 lb)   SpO2 100%   BMI 25.06 kg/m²       Physical Exam    Physical Exam  Pelvic: Cervix is irritated and bleeds easily. There are little dots around the opening of the cervix but not around the whole cervix.      Results    The following data was reviewed by: KAYLAN Low on 06/02/2025:          Results  Labs   - Urine test: Leukocytes and proteins       Assessment and Plan        Assessment and Plan    Diagnoses and all " orders for this visit:    1. Encounter for Papanicolaou smear of cervix (Primary)  -     IGP, Rfx Aptima HPV ASCU; Future  -     IGP, Rfx Aptima HPV ASCU    2. Dysuria  -     POCT urinalysis dipstick, automated  -     Urine Culture - Urine, Urine, Clean Catch; Future  -     Urine Culture - Urine, Urine, Clean Catch    3. Possible exposure to STD  -     Gardnerella vaginalis, Trichomonas vaginalis, Candida albicans, DNA - Swab, Vagina  -     Chlamydia trachomatis, Neisseria gonorrhoeae, PCR - , Cervix; Future  -     Chlamydia trachomatis, Neisseria gonorrhoeae, PCR - , Cervix        Assessment & Plan  1. Abnormal menstrual bleeding.  - Reports having abnormal periods, with the most recent period lasting only 2 days.  - Pelvic exam revealed an irritated cervix with bleeding and small dots around the cervical opening.  - Pap smear conducted; results expected by Friday or Monday.  - Intermittent bleeding could be due to cervical irritation; results will be communicated once available.    2. Suspected urinary tract infection.  - Reports burning during urination and a distinct smell, raising concerns about a UTI.  - Urine sample showed leukocytes and proteins.  - Urine sample will be sent for culture to confirm the diagnosis.  - Results will be communicated once available.    3. Suspected sexually transmitted infection.  - Expressed concerns about a possible STI due to the presence of leukocytes and proteins in urine.  - Swabs taken during the pelvic exam to test for STIs; results expected by tomorrow.  - Results will be communicated and any necessary treatment will be discussed.        Follow Up        Follow Up   Return if symptoms worsen or fail to improve.  Patient was given instructions and counseling regarding her condition or for health maintenance advice. Please see specific information pulled into the AVS if appropriate.

## 2025-06-04 DIAGNOSIS — N76.0 BACTERIAL VAGINOSIS: Primary | ICD-10-CM

## 2025-06-04 DIAGNOSIS — B96.89 BACTERIAL VAGINOSIS: Primary | ICD-10-CM

## 2025-06-04 LAB
BACTERIA SPEC AEROBE CULT: NO GROWTH
CANDIDA RRNA VAG QL PROBE: NEGATIVE
G VAGINALIS RRNA GENITAL QL PROBE: POSITIVE
T VAGINALIS RRNA GENITAL QL PROBE: NEGATIVE

## 2025-06-04 RX ORDER — METRONIDAZOLE 500 MG/1
500 TABLET ORAL 2 TIMES DAILY
Qty: 14 TABLET | Refills: 0 | Status: SHIPPED | OUTPATIENT
Start: 2025-06-04

## 2025-06-05 ENCOUNTER — TELEPHONE (OUTPATIENT)
Dept: OBSTETRICS AND GYNECOLOGY | Age: 25
End: 2025-06-05
Payer: COMMERCIAL

## 2025-06-05 DIAGNOSIS — R87.612 LGSIL ON PAP SMEAR OF CERVIX: Primary | ICD-10-CM

## 2025-06-05 LAB
CONV .: ABNORMAL
CYTOLOGIST CVX/VAG CYTO: ABNORMAL
CYTOLOGY CVX/VAG DOC CYTO: ABNORMAL
CYTOLOGY CVX/VAG DOC THIN PREP: ABNORMAL
DX ICD CODE: ABNORMAL
DX ICD CODE: ABNORMAL
OTHER STN SPEC: ABNORMAL
PATHOLOGIST CVX/VAG CYTO: ABNORMAL
RECOM F/U CVX/VAG CYTO: ABNORMAL
SERVICE CMNT-IMP: ABNORMAL
STAT OF ADQ CVX/VAG CYTO-IMP: ABNORMAL

## 2025-06-05 NOTE — TELEPHONE ENCOUNTER
Caller: Kristina Parson    Relationship to patient: Self    Best call back number: [phone]    Chief complaint: HUB RECD ADDITIONAL REFERRAL INDICATING A LGSIL ON PAP FROM REFERRING PROVIDER. PT WAS ALREADY KALI FOR 7-29 W/CHIKI FOR DX IRR CYCLES ONLY. PER COMM, I CLOSED OUT THE REFERRAL PER OFFICE COMMENTS. HOWEVER, I DID NOTICE THAT DR. KHOURY HAD JULY 1 AVAIL, DOES THIS NEW RESULT WARRANT AN EARLIER APPT? OR NO?    Type of visit: NEW GYN    Requested date: UP TO OFFICE     If rescheduling, when is the original appointment:      Additional notes:

## 2025-06-24 NOTE — PROGRESS NOTES
"GYN Visit    Chief Complaint   Patient presents with    Abnormal Pap Smear     First abnormal pap, LGSIL on 25        HPI:   25 y.o. with LMP 2025 here for abnormal Pap smear and abnormal uterine bleeding.  Menses q. 23 to 40 days x 7 to 20 days.  Patient states she is currently not using any birth control.  She had an  in 2023.  She does vape.  She did use the IUD as well as Depo after delivery and discontinued due to bleeding.  She had a Pap smear performed 2025 LGSIL.  Previous Pap smear in  was negative.  She has had all 3 of the HPV vaccines (13,14,2/10/15).  She does vape daily.      History: PMHx, Meds, Allergies, PSHx, Social Hx, and POBHx all reviewed and updated.    PHYSICAL EXAM:  /69   Pulse 68   Ht 157.5 cm (62\")   Wt 63 kg (139 lb)   LMP 2025 Comment: Irregular periods, bleeding for 7 days, menses ended , heavy in beginning, changing super product every 5 hrs  Breastfeeding No   BMI 25.42 kg/m²   General- NAD, alert and oriented, appropriate  Psych- Normal mood, good memory        IGP, Rfx Aptima HPV ASCU (2025 11:49)    ASSESSMENT AND PLAN:    Diagnoses and all orders for this visit:    1. LGSIL on Pap smear of cervix (Primary)    2. Abnormal uterine bleeding (AUB)    3. Vaping nicotine dependence, tobacco product          [facility] 2023  Cervical Cancer Screening  A Review: Virtually all high-risk HPV genotypes in the alpha-9 species group are carcinogenic (HPV-16, -31, -33, -35, -52, and -58).14 HPV-16 is the most carcinogenic and is associated with more than 60% of cervical squamous cancers and adenocarcinomas and with oropharyngeal and other anogenital cancers.2,11,15 Other alpha-9 HPV genotypes (HPV-31, -33, -35, -52, and -58) are medium risk and are each responsible for 2% to 4% of cancers.2,11 Regional variation exists in the HPV genotypes associated with cervical cancer. For example, HPV-35 is associated with higher " cancer risks among individuals of  descent than individuals of other racial backgrounds.16 In the alpha-7 species group, HPV-18 and -45 are associated with both squamous cancers and adenocarcinomas, and together cause approximately 20% of cancers.2,11 The less carcinogenic alpha-7 genotypes, HPV-39, -59, and -68 and the species alpha-5 (HPV-51) and alpha-6 (HPV-56) are lower risk carcinogenic genotypes, each responsible for less than 2% of cancers.2,11 HPV genotype allows risk stratification and informs management, with colposcopy recommended when HPV-16 or -18 is detected.10 When HPV results are positive for genotypes other than 16 or 18, additional information is important for determining the need for colposcopy.  Most of the sexually active population is estimated to be infected with HPV during their lifetimes, although the exact percentage is unknown. Therefore, a positive HPV test result should simply be considered a marker of sexual activity. Cancers develop in people with persistence of an HPV infection that is not controlled by the immune system. The most important factors in determining risk of cervical cancer are HPV positivity, HPV genotype, and cytological changes associated with HPV-related cell transformation  The risk of cervical cancer begins to increase around age 30 years and remains elevated for the remainder of the lifespan.50 Therefore, screening is recommended at least every 5 years for individuals aged 25 through 65 years who have a cervix (eg, women and transgender men who have not undergone hysterectomy). The US Preventive Services Task Force recommends screening average-risk individuals with cytology alone at ages 21 through 29 years and with HPV testing alone, HPV testing with cytology (cotesting), or cytology alone at ages 30 through 65 years.46 Updated guidelines from the American Cancer Society,51 noting a better balance of benefits and harms of HPV testing than cytology,47  recommend HPV testing alone at 5-year intervals for those aged 25 though 65 years  The risk of precancer is used to determine the next steps in management following abnormal results.10 This reduces testing in low-risk patients while increasing testing in high-risk patients, resulting in fewer procedures and better cancer prevention.58 Reductions in overtesting mean that when clinicians implement risk-based guidelines, they will more frequently encounter abnormal results because (1) high-risk individuals screen more often than low-risk individuals; (2) colposcopy is deferred for some patients, but these individuals require follow-up in 1 year; and (3) a higher proportion of patients undergoing colposcopy will be diagnosed with precancer requiring treatment because colposcopy is deferred for lower-risk patients.  This patient is <25 so she should only be screened with cytology alone. Recommendation on this patient is repeat pap 1 year. We discussed importance of discontinuing tobacco products in all forms.With review of her medical records in [facility], it is possible in 2014 or 2015 that she received the gardasil 9 as it was made available in December 2014.     The patient used an IUD and also used depo provera after delivery. Both can be associated with irregular bleeding. The side effects of depo provera can be irregular bleeding x several months. Pt declines birth control. She will continue to monitor          Follow Up:  No follow-ups on file.    I spent 30 minutes caring for Kristina on this date of service. This time includes time spent by me in the following activities:obtaining and/or reviewing a separately obtained history, performing a medically appropriate examination and/or evaluation , counseling and educating the patient/family/caregiver, documenting information in the medical record, and independently interpreting results and communicating that information with the patient/family/caregiver      May LISA  DO Herb  07/01/2025    [facility]  [facility]  [address] [place] [address] [address] [place] [address]  Dept: [phone]  Dept Fax: [phone]  Loc: [phone]  Loc Fax: [phone]

## 2025-07-01 ENCOUNTER — OFFICE VISIT (OUTPATIENT)
Dept: OBSTETRICS AND GYNECOLOGY | Age: 25
End: 2025-07-01
Payer: COMMERCIAL

## 2025-07-01 VITALS
DIASTOLIC BLOOD PRESSURE: 69 MMHG | HEART RATE: 68 BPM | SYSTOLIC BLOOD PRESSURE: 106 MMHG | WEIGHT: 139 LBS | BODY MASS INDEX: 25.58 KG/M2 | HEIGHT: 62 IN

## 2025-07-01 DIAGNOSIS — N93.9 ABNORMAL UTERINE BLEEDING (AUB): ICD-10-CM

## 2025-07-01 DIAGNOSIS — R87.612 LGSIL ON PAP SMEAR OF CERVIX: Primary | ICD-10-CM

## 2025-07-01 DIAGNOSIS — F17.290 VAPING NICOTINE DEPENDENCE, TOBACCO PRODUCT: ICD-10-CM

## 2025-07-01 PROCEDURE — 99203 OFFICE O/P NEW LOW 30 MIN: CPT | Performed by: OBSTETRICS & GYNECOLOGY

## 2025-07-01 PROCEDURE — 1160F RVW MEDS BY RX/DR IN RCRD: CPT | Performed by: OBSTETRICS & GYNECOLOGY

## 2025-07-01 PROCEDURE — 1159F MED LIST DOCD IN RCRD: CPT | Performed by: OBSTETRICS & GYNECOLOGY

## 2025-07-11 NOTE — PROGRESS NOTES
GYN Problem/Follow Up Visit    Chief Complaint   Patient presents with    Vaginitis           HPI  Kristina Parson is a 25 y.o. female, , who presents for increased discharge and vaginal odor x 4 days. Hx of BV  Sexually active, not using condoms, does not desires contraceptive treatment.   Using sensitive soaps, some soaking in the tub  No new partner or concerns for sti       Gardnerella vaginalis, Trichomonas vaginalis, Candida albicans, DNA - Swab, Vagina (2025 11:49)    PDF Report (2025 11:49)    Chlamydia trachomatis, Neisseria gonorrhoeae, PCR - , Cervix (2025 11:49)    Additional OB/GYN History   Patient's last menstrual period was 2025 (exact date).  Current contraception: contraceptive methods: None    Past Medical History:   Diagnosis Date    Abnormal Pap smear of cervix     Anxiety     Closed displaced fracture of fifth metatarsal bone 2018    Femur fracture     POLI (generalized anxiety disorder)     Hand injury     Right    Hand pain, right     Migraine     Nausea 2021    Otalgia     Otitis media       Past Surgical History:   Procedure Laterality Date    ORIF DISTAL FEMUR FRACTURE Right     WISDOM TOOTH EXTRACTION        Family History   Problem Relation Age of Onset    No Known Problems Father     Depression Mother     Stroke Paternal Grandfather     Diabetes Paternal Grandfather     Stroke Paternal Grandmother     Diabetes Paternal Grandmother     Hypertension Paternal Grandmother     Hearing loss Paternal Aunt     Breast cancer Neg Hx     Ovarian cancer Neg Hx     Uterine cancer Neg Hx     Colon cancer Neg Hx     Deep vein thrombosis Neg Hx     Pulmonary embolism Neg Hx     Prostate cancer Neg Hx      Allergies as of 2025 - Reviewed 2025   Allergen Reaction Noted    Wellbutrin [bupropion] Palpitations 2022      The additional following portions of the patient's history were reviewed and updated as appropriate: allergies, current  medications, past family history, past medical history, past social history, past surgical history, and problem list.    Review of Systems    See HPI for pertinent ROS    Objective   BP 96/59   Pulse 67   Wt 61.7 kg (136 lb)   LMP 07/03/2025 (Exact Date)   BMI 24.87 kg/m²     Physical Exam   Chaperone present   General- NAD, alert and oriented, appropriate  Psych- Normal mood, good memory  Lymphatic- No palpable groin nodes  CV- Regular rhythm, no murmurs  Resp- CTA to bases, no wheezes  Abdomen- Soft, non distended, non tender, no masses  External genitalia- Normal female, no lesions, no atrophy  Urethra/meatus- Normal, no masses, non tender  Bladder- Normal, no masses, non tender  Vagina- Normal, no atrophy, no lesions, small amount mucoid yellow discharge, no prolapse  Cvx- Normal, no lesions, no discharge, No cervical motion tenderness  Uterus- Normal size, shape & consistency.  Non tender, mobile.  Adnexa- No mass, non tender  Anus/Rectum/Perineum- Not performed  Ext- No edema, no cyanosis    Skin- No lesions, no rashes, no acanthosis nigricans         Assessment and Plan    Diagnoses and all orders for this visit:    1. Acute vaginitis (Primary)  -     MVP Vaginosis Panel - Swab, Vagina  -     Genital Culture - Swab, Vagina  -     Ct, Ng, Mycoplasmas TIMA, Swab - Swab, Cervix      Counseling:  Bacterial Vaginosis prevention: recommend use hypoallergenic, PH balanced products including soaps and detergents, sensitive skin products. Avoid scented liners, tampons, wipes, or scented toilet paper. Condom use, avoid multiple partners, douching, bubble bath, anal contact during intercourse, thong underwear, and shaving in genital area. Over the counter vaginal probiotic suppositories may be used to maintain a healthy vaginal PH- example: [employer] (Vagibiom)    Recommend PNV daily and healthy lifestyle if desires pregnancy      Follow Up:  Return if symptoms worsen or fail to improve.        Shayla Davis  KAYLAN Logan  07/17/2025

## 2025-07-17 ENCOUNTER — OFFICE VISIT (OUTPATIENT)
Dept: OBSTETRICS AND GYNECOLOGY | Age: 25
End: 2025-07-17
Payer: COMMERCIAL

## 2025-07-17 VITALS
HEART RATE: 67 BPM | DIASTOLIC BLOOD PRESSURE: 59 MMHG | BODY MASS INDEX: 24.87 KG/M2 | WEIGHT: 136 LBS | SYSTOLIC BLOOD PRESSURE: 96 MMHG

## 2025-07-17 DIAGNOSIS — N76.0 ACUTE VAGINITIS: Primary | ICD-10-CM

## 2025-07-17 DIAGNOSIS — R00.0 TACHYCARDIA: Chronic | ICD-10-CM

## 2025-07-17 LAB
BACTERIAL VAGINOSIS VAG-IMP: POSITIVE
CANDIDA DNA VAG QL NAA+PROBE: NOT DETECTED
CANDIDA DNA VAG QL NAA+PROBE: NOT DETECTED
T VAGINALIS DNA VAG QL NAA+PROBE: NOT DETECTED

## 2025-07-17 PROCEDURE — 87591 N.GONORRHOEAE DNA AMP PROB: CPT | Performed by: NURSE PRACTITIONER

## 2025-07-17 PROCEDURE — 87070 CULTURE OTHR SPECIMN AEROBIC: CPT | Performed by: NURSE PRACTITIONER

## 2025-07-17 PROCEDURE — 87491 CHLMYD TRACH DNA AMP PROBE: CPT | Performed by: NURSE PRACTITIONER

## 2025-07-17 PROCEDURE — 87798 DETECT AGENT NOS DNA AMP: CPT | Performed by: NURSE PRACTITIONER

## 2025-07-17 PROCEDURE — 81515 NFCT DS BV&VAGINITIS DNA ALG: CPT | Performed by: NURSE PRACTITIONER

## 2025-07-17 PROCEDURE — 87205 SMEAR GRAM STAIN: CPT | Performed by: NURSE PRACTITIONER

## 2025-07-18 ENCOUNTER — RESULTS FOLLOW-UP (OUTPATIENT)
Dept: OBSTETRICS AND GYNECOLOGY | Age: 25
End: 2025-07-18
Payer: COMMERCIAL

## 2025-07-18 RX ORDER — PAROXETINE 10 MG/1
10 TABLET, FILM COATED ORAL EVERY MORNING
Qty: 90 TABLET | Refills: 1 | Status: SHIPPED | OUTPATIENT
Start: 2025-07-18

## 2025-07-18 RX ORDER — METOPROLOL SUCCINATE 25 MG/1
25 TABLET, EXTENDED RELEASE ORAL DAILY
Qty: 30 TABLET | Refills: 3 | Status: SHIPPED | OUTPATIENT
Start: 2025-07-18

## 2025-07-18 RX ORDER — METRONIDAZOLE 7.5 MG/G
1 GEL VAGINAL
Qty: 70 G | Refills: 0 | Status: SHIPPED | OUTPATIENT
Start: 2025-07-18 | End: 2025-07-23

## 2025-07-20 LAB
BACTERIA SPEC AEROBE CULT: NORMAL
GRAM STN SPEC: NORMAL

## 2025-07-22 DIAGNOSIS — Z22.39 CARRIER OF UREAPLASMA UREALYTICUM: Primary | ICD-10-CM

## 2025-07-22 DIAGNOSIS — R00.0 TACHYCARDIA: Chronic | ICD-10-CM

## 2025-07-22 LAB
C TRACH DNA SPEC QL NAA+PROBE: NEGATIVE
M GENITALIUM DNA SPEC QL NAA+PROBE: NEGATIVE
M HOMINIS DNA SPEC QL NAA+PROBE: NEGATIVE
N GONORRHOEA DNA VAG QL NAA+PROBE: NEGATIVE
UREAPLASMA DNA SPEC QL NAA+PROBE: POSITIVE

## 2025-07-22 RX ORDER — METOPROLOL SUCCINATE 25 MG/1
25 TABLET, EXTENDED RELEASE ORAL DAILY
Qty: 30 TABLET | Refills: 3 | Status: SHIPPED | OUTPATIENT
Start: 2025-07-22

## 2025-07-22 RX ORDER — PAROXETINE 10 MG/1
10 TABLET, FILM COATED ORAL EVERY MORNING
Qty: 90 TABLET | Refills: 1 | Status: SHIPPED | OUTPATIENT
Start: 2025-07-22

## 2025-07-22 RX ORDER — DOXYCYCLINE 100 MG/1
100 CAPSULE ORAL 2 TIMES DAILY
Qty: 14 CAPSULE | Refills: 0 | Status: SHIPPED | OUTPATIENT
Start: 2025-07-22 | End: 2025-07-29

## 2025-07-23 NOTE — TELEPHONE ENCOUNTER
Patient called back and informed of her results. Patient does request partner treatment and message sent to the provider with partners name. She requests to be called when the script is ready.